# Patient Record
Sex: MALE | Race: OTHER | NOT HISPANIC OR LATINO | Employment: FULL TIME | URBAN - METROPOLITAN AREA
[De-identification: names, ages, dates, MRNs, and addresses within clinical notes are randomized per-mention and may not be internally consistent; named-entity substitution may affect disease eponyms.]

---

## 2018-12-07 ENCOUNTER — HOSPITAL ENCOUNTER (EMERGENCY)
Facility: MEDICAL CENTER | Age: 23
End: 2018-12-07
Attending: EMERGENCY MEDICINE
Payer: COMMERCIAL

## 2018-12-07 VITALS
HEIGHT: 65 IN | RESPIRATION RATE: 18 BRPM | HEART RATE: 100 BPM | DIASTOLIC BLOOD PRESSURE: 80 MMHG | SYSTOLIC BLOOD PRESSURE: 111 MMHG | TEMPERATURE: 98.5 F | OXYGEN SATURATION: 96 % | BODY MASS INDEX: 20.83 KG/M2 | WEIGHT: 125 LBS

## 2018-12-07 DIAGNOSIS — Z63.9 RELATIONSHIP PROBLEMS: ICD-10-CM

## 2018-12-07 DIAGNOSIS — F43.21 SITUATIONAL DEPRESSION: ICD-10-CM

## 2018-12-07 PROCEDURE — 99285 EMERGENCY DEPT VISIT HI MDM: CPT

## 2018-12-07 SDOH — SOCIAL STABILITY - SOCIAL INSECURITY: PROBLEM RELATED TO PRIMARY SUPPORT GROUP, UNSPECIFIED: Z63.9

## 2018-12-07 NOTE — PROGRESS NOTES
Pt cleared for discharge by ERP.    Discharging patient home. Verbalized understanding of discharge instructions, follow up appointments, and home care. All questions answered and all personal belongings with patient at time of discharge. Vital signs WNL.     Patient given discharge information papers and discharge assessment completed. All belongings returned to pt. Pt to lobby with steady gait.

## 2018-12-07 NOTE — ED PROVIDER NOTES
"ED Provider Note    Scribed for Mando Powers M.D. by Rachid Osorio. 12/7/2018  4:29 AM    Primary care provider: Pcp Pt States None  Means of arrival: Law Enforcement  History obtained from: Patient  History limited by: None    CHIEF COMPLAINT  Chief Complaint   Patient presents with   • Suicidal Ideation       HPI  Loyd Devi is a 23 y.o. male who presents to the Emergency Department for evaluation of suicidal remarks stated today to his girlfriend. He reports he recently broke up with his girlfriend causing him to make suicidal remarks. He stated he wanted \"to shoot himself and die,\" however reports he has no intentions to hurt himself at this time. He does have access to a gun, which is located in his car. He admits to alcohol use last night at 9 PM, a couple beers. He is currently a student at [employer] and admits to being stressed lately due to school and upcoming finals. No homicidal ideations or suicidal ideations at this time.     REVIEW OF SYSTEMS  Pertinent positives include suicidal remarks.  Pertinent negatives include no suicidal or homicideal ideation.    All other systems reviewed and negative. See HPI for further details.       PAST MEDICAL HISTORY   None noted     SURGICAL HISTORY  patient denies any surgical history    SOCIAL HISTORY  Social History   Substance Use Topics   • Smoking status: Never Smoker   • Alcohol use No      History   Drug Use No       FAMILY HISTORY  None noted     CURRENT MEDICATIONS  No current facility-administered medications on file prior to encounter.      No current outpatient prescriptions on file prior to encounter.      ALLERGIES  No Known Allergies    PHYSICAL EXAM  VITAL SIGNS: /70   Pulse (!) 110   Temp 36.9 °C (98.5 °F) (Oral)   Resp 18   Ht 1.651 m (5' 5\")   Wt 56.7 kg (125 lb)   SpO2 95%   BMI 20.80 kg/m²     Nursing note and vitals reviewed.  Constitutional: Well-developed and well-nourished. No distress.   HENT: Head is normocephalic and " atraumatic. Oropharynx is clear and moist without exudate or erythema.   Eyes: Pupils are equal, round, and reactive to light. Conjunctiva are normal.   Cardiovascular: Normal rate and regular rhythm. No murmur heard. Normal radial pulses.  Pulmonary/Chest: Breath sounds normal. No wheezes or rales.   Abdominal: Soft and non-tender. No distention    Musculoskeletal: Extremities exhibit normal range of motion without edema or tenderness.   Neurological: Awake, alert and oriented to person, place, and time. No focal deficits noted.  Skin: Skin is warm and dry. No rash.   Psychiatric: Normal mood and affect. Appropriate for clinical situation. Denies suicidal ideations at this time    DIAGNOSTIC STUDIES / PROCEDURES    LABS        COURSE & MEDICAL DECISION MAKING  Nursing notes, VS, PMSFHx reviewed in chart.     Review of past medical records shows the patient has no recent ED visits.     4:29 AM - Patient seen and examined at bedside. Ordered urine drug screen and POC breathalizer to evaluate his symptoms. Upon examination, the patient admits to making suicidal remarks, however reports he does not want to die or hurt himself at this time. I deem the patient as not a harm to himself or others at this time. Patient will be discharged.     The patient is forward thinking.  He is excited about the end of the semester.  He is studying criminal justice.  He said he made some suicidal comments to get some attention in the setting of breaking up with his girlfriend.  He says that he does not have any suicidal thoughts.  He has no homicidal thoughts.  He does not have any desire to hurt himself or others.  The patient is very reasonable, he has occasional brightening, he has a good plan for self-care.  I do not feel that this patient meets legal 2000 criteria.    The patient will return for new or worsening symptoms and is stable at the time of discharge.    The patient is referred to a primary physician for blood pressure  management, diabetic screening, and for all other preventative health concerns.    DISPOSITION:  Patient will be discharged home in stable condition.    FOLLOW UP:  [facility], Emergency Dept  [address]  [place] [address] [phone]  Schedule an appointment as soon as possible for a visit       [facility]    Schedule an appointment as soon as possible for a visit         FINAL IMPRESSION  1. Situational depression    2. Relationship problems          IRachid (Griffin), am scribing for, and in the presence of, Mando Powers M.D..    Electronically signed by: Rachid Osorio (Gr[place]), 12/7/2018    IMando M.D. personally performed the services described in this documentation, as scribed by Rachid Osorio in my presence, and it is both accurate and complete. E.     The note accurately reflects work and decisions made by me.  Mando Powers  12/7/2018  11:06 AM

## 2018-12-07 NOTE — ED TRIAGE NOTES
"Chief Complaint   Patient presents with   • Suicidal Ideation     Pt brought in by PD on legal hold after he stated he wanted to \"shoot himself and die\".     Pt belongings bagged.   "

## 2020-11-19 ENCOUNTER — OFFICE VISIT (OUTPATIENT)
Dept: URGENT CARE | Facility: PHYSICIAN GROUP | Age: 25
End: 2020-11-19
Payer: COMMERCIAL

## 2020-11-19 VITALS
SYSTOLIC BLOOD PRESSURE: 112 MMHG | HEIGHT: 65 IN | TEMPERATURE: 98.4 F | WEIGHT: 134 LBS | OXYGEN SATURATION: 98 % | BODY MASS INDEX: 22.33 KG/M2 | HEART RATE: 91 BPM | RESPIRATION RATE: 13 BRPM | DIASTOLIC BLOOD PRESSURE: 72 MMHG

## 2020-11-19 DIAGNOSIS — J02.9 SORE THROAT: ICD-10-CM

## 2020-11-19 DIAGNOSIS — J02.0 STREP PHARYNGITIS: Primary | ICD-10-CM

## 2020-11-19 LAB
INT CON NEG: NORMAL
INT CON POS: NORMAL
S PYO AG THROAT QL: POSITIVE

## 2020-11-19 PROCEDURE — 87880 STREP A ASSAY W/OPTIC: CPT | Performed by: PHYSICIAN ASSISTANT

## 2020-11-19 PROCEDURE — 99203 OFFICE O/P NEW LOW 30 MIN: CPT | Performed by: PHYSICIAN ASSISTANT

## 2020-11-19 RX ORDER — AMOXICILLIN 500 MG/1
500 CAPSULE ORAL 2 TIMES DAILY
Qty: 20 CAP | Refills: 0 | Status: SHIPPED | OUTPATIENT
Start: 2020-11-19 | End: 2020-11-27

## 2020-11-19 RX ORDER — FLUTICASONE PROPIONATE 50 MCG
SPRAY, SUSPENSION (ML) NASAL
COMMUNITY
Start: 2020-09-10 | End: 2021-05-18

## 2020-11-19 RX ORDER — AMOXICILLIN 500 MG/1
500 CAPSULE ORAL 2 TIMES DAILY
Qty: 20 CAP | Refills: 0 | Status: SHIPPED | OUTPATIENT
Start: 2020-11-19 | End: 2020-11-19 | Stop reason: SDUPTHER

## 2020-11-19 RX ORDER — BECLOMETHASONE DIPROPIONATE HFA 80 UG/1
2 AEROSOL, METERED RESPIRATORY (INHALATION)
COMMUNITY
Start: 2020-09-10 | End: 2021-05-18

## 2020-11-19 ASSESSMENT — ENCOUNTER SYMPTOMS
NAUSEA: 0
CONSTIPATION: 0
VOMITING: 0
ABDOMINAL PAIN: 0
SWOLLEN GLANDS: 1
DIARRHEA: 0
HOARSE VOICE: 1
SHORTNESS OF BREATH: 0
CHILLS: 0
COUGH: 0
SORE THROAT: 1
FEVER: 0

## 2020-11-19 NOTE — PROGRESS NOTES
"Subjective:   Loyd Devi is a 25 y.o. male who presents for Sore Throat (needs work release)        Pharyngitis   This is a new problem. Episode onset: 4 days. The problem has been unchanged. There has been no fever. Associated symptoms include congestion, a hoarse voice and swollen glands. Pertinent negatives include no abdominal pain, coughing, diarrhea, ear pain, plugged ear sensation, shortness of breath or vomiting. He has tried nothing for the symptoms.      No loss of smell or taste. No known ill contacts or covid exposure.     Covid test on Tuesday through [facility]. Pt work is requiring a note excusing him from work while covid test is pending.     Review of Systems   Constitutional: Negative for chills, fever and malaise/fatigue.   HENT: Positive for congestion, hoarse voice and sore throat. Negative for ear pain.    Respiratory: Negative for cough and shortness of breath.    Gastrointestinal: Negative for abdominal pain, constipation, diarrhea, nausea and vomiting.   All other systems reviewed and are negative.      PMH:  has no past medical history on file.  MEDS:   Current Outpatient Medications:   •  amoxicillin (AMOXIL) 500 MG Cap, Take 1 Cap by mouth 2 times a day for 10 days., Disp: 20 Cap, Rfl: 0  •  QVAR REDIHALER 80 MCG/ACT inhaler, Inhale 2 Puffs., Disp: , Rfl:   •  fluticasone (FLONASE) 50 MCG/ACT nasal spray, USE 2 SPRAY(S) IN EACH NOSTRIL ONCE DAILY, Disp: , Rfl:   ALLERGIES: No Known Allergies  SURGHX: History reviewed. No pertinent surgical history.  SOCHX:  reports that he has never smoked. He has never used smokeless tobacco. He reports that he does not drink alcohol or use drugs.  History reviewed. No pertinent family history.     Objective:   /72   Pulse 91   Temp 36.9 °C (98.4 °F)   Resp 13   Ht 1.651 m (5' 5\")   Wt 60.8 kg (134 lb)   SpO2 98%   BMI 22.30 kg/m²     Physical Exam  Vitals signs reviewed.   Constitutional:       General: He is not in acute " distress.     Appearance: Normal appearance. He is well-developed. He is not ill-appearing.   HENT:      Head: Normocephalic and atraumatic.      Right Ear: Tympanic membrane and external ear normal.      Left Ear: Tympanic membrane and external ear normal.      Nose: Nose normal.      Mouth/Throat:      Lips: Pink.      Mouth: Mucous membranes are moist.      Pharynx: Oropharynx is clear. Uvula midline.      Tonsils: Tonsillar exudate present. No tonsillar abscesses. 1+ on the right. 1+ on the left.   Eyes:      Conjunctiva/sclera: Conjunctivae normal.      Pupils: Pupils are equal, round, and reactive to light.   Neck:      Musculoskeletal: Normal range of motion and neck supple.      Trachea: No tracheal deviation.   Cardiovascular:      Rate and Rhythm: Normal rate and regular rhythm.   Pulmonary:      Effort: Pulmonary effort is normal. No respiratory distress.      Breath sounds: Normal breath sounds. No stridor. No wheezing, rhonchi or rales.   Skin:     General: Skin is warm and dry.      Capillary Refill: Capillary refill takes less than 2 seconds.   Neurological:      General: No focal deficit present.      Mental Status: He is alert and oriented to person, place, and time.   Psychiatric:         Mood and Affect: Mood normal.         Behavior: Behavior normal.           Assessment/Plan:     1. Strep pharyngitis  amoxicillin (AMOXIL) 500 MG Cap    DISCONTINUED: amoxicillin (AMOXIL) 500 MG Cap   2. Sore throat  POCT Rapid Strep A     Rapid strep positive    Supportive care reviewed.  Start warm salt water gargles, increase fluids.  Work note provided.    If symptoms worsen or persist patient can return to clinic for reevaluation. All side effects of medication discussed including allergic response, GI upset, tendon injury, etc. Patient confirmed understanding of information.    Please note that this dictation was created using voice recognition software. I have made every reasonable attempt to correct  obvious errors, but I expect that there are errors of grammar and possibly content that I did not discover before finalizing the note.

## 2020-11-27 ENCOUNTER — OFFICE VISIT (OUTPATIENT)
Dept: URGENT CARE | Facility: CLINIC | Age: 25
End: 2020-11-27
Payer: COMMERCIAL

## 2020-11-27 ENCOUNTER — APPOINTMENT (OUTPATIENT)
Dept: RADIOLOGY | Facility: IMAGING CENTER | Age: 25
End: 2020-11-27
Attending: PHYSICIAN ASSISTANT
Payer: COMMERCIAL

## 2020-11-27 VITALS
TEMPERATURE: 98.1 F | SYSTOLIC BLOOD PRESSURE: 116 MMHG | WEIGHT: 135 LBS | HEIGHT: 63 IN | DIASTOLIC BLOOD PRESSURE: 88 MMHG | BODY MASS INDEX: 23.92 KG/M2 | RESPIRATION RATE: 18 BRPM | HEART RATE: 110 BPM | OXYGEN SATURATION: 97 %

## 2020-11-27 DIAGNOSIS — R06.02 SHORTNESS OF BREATH: ICD-10-CM

## 2020-11-27 DIAGNOSIS — R06.6 SINGULTUS: ICD-10-CM

## 2020-11-27 PROCEDURE — 93000 ELECTROCARDIOGRAM COMPLETE: CPT | Performed by: PHYSICIAN ASSISTANT

## 2020-11-27 PROCEDURE — 99214 OFFICE O/P EST MOD 30 MIN: CPT | Performed by: PHYSICIAN ASSISTANT

## 2020-11-27 PROCEDURE — 71046 X-RAY EXAM CHEST 2 VIEWS: CPT | Mod: TC,FY | Performed by: PHYSICIAN ASSISTANT

## 2020-11-27 ASSESSMENT — ENCOUNTER SYMPTOMS
CHILLS: 0
HEADACHES: 0
PND: 0
VOMITING: 0
ORTHOPNEA: 0
MYALGIAS: 0
FEVER: 0
PALPITATIONS: 0
ABDOMINAL PAIN: 0
NAUSEA: 0
DIARRHEA: 0
SHORTNESS OF BREATH: 1
SORE THROAT: 0
EYE PAIN: 0
COUGH: 0
CLAUDICATION: 0
WHEEZING: 0
SPUTUM PRODUCTION: 0
CONSTIPATION: 0

## 2020-11-28 NOTE — PROGRESS NOTES
Subjective:   Loyd Devi is a 25 y.o. male who presents for Shortness of Breath (x today)      Is a 25-year-old male who is a long history of shortness of breath presenting today with another episode of shortness of breath started this morning.  He has been fully evaluated by pulmonology and was told this is likely not a pulmonary issue.  He is also tried albuterol and Qvar inhalers and has noticed no benefit.  He denies any associated chest pain, diaphoresis.  He does feel occasionally dizzy during these episodes although his dizziness is subsided by the time he was seen in urgent care.  He notes he has frequent hiccups and feels like he cannot catch his breath.  He denies any inciting allergens or events this morning, he tried his albuterol inhaler without any relief.  He has had a significant work-up which I cannot access from [facility] where his primary care provider is based      Review of Systems   Constitutional: Negative for chills and fever.   HENT: Negative for congestion, ear pain and sore throat.    Eyes: Negative for pain.   Respiratory: Positive for shortness of breath. Negative for cough, sputum production and wheezing.    Cardiovascular: Negative for chest pain, palpitations, orthopnea, claudication, leg swelling and PND.   Gastrointestinal: Negative for abdominal pain, constipation, diarrhea, nausea and vomiting.   Genitourinary: Negative for dysuria.   Musculoskeletal: Negative for myalgias.   Skin: Negative for rash.   Neurological: Negative for headaches.       Medications:    • fluticasone  • Qvar RediHaler Aerb    Allergies: Patient has no known allergies.    Problem List: Loyd Devi does not have a problem list on file.    Surgical History:  No past surgical history on file.    Past Social Hx: Loyd Devi  reports that he has never smoked. He has never used smokeless tobacco. He reports that he does not drink alcohol or use drugs.     Past Family Hx:  " Loyd Devi family history is not on file.     Problem list, medications, and allergies reviewed by myself today in Epic.     Objective:     /88   Pulse (!) 110   Temp 36.7 °C (98.1 °F) (Temporal)   Resp 18   Ht 1.6 m (5' 3\")   Wt 61.2 kg (135 lb)   SpO2 97%   BMI 23.91 kg/m²     Physical Exam  Vitals signs reviewed.   Constitutional:       Appearance: Normal appearance.      Comments: Alert nontoxic male speaking in full sentences, occasionally burping or catching his breath while speaking.  No increased work of breathing   HENT:      Head: Normocephalic and atraumatic.      Right Ear: External ear normal.      Left Ear: External ear normal.      Nose: Nose normal.      Mouth/Throat:      Mouth: Mucous membranes are moist.   Eyes:      Conjunctiva/sclera: Conjunctivae normal.   Cardiovascular:      Rate and Rhythm: Normal rate and regular rhythm.      Pulses: Normal pulses.      Heart sounds: Normal heart sounds.   Pulmonary:      Effort: Pulmonary effort is normal. No respiratory distress.      Breath sounds: Normal breath sounds. No wheezing, rhonchi or rales.   Musculoskeletal:      Right lower leg: No edema.      Left lower leg: No edema.   Skin:     General: Skin is warm and dry.      Capillary Refill: Capillary refill takes less than 2 seconds.   Neurological:      Mental Status: He is alert and oriented to person, place, and time.         RADIOLOGY RESULTS   Dx-chest-2 Views    Result Date: 11/27/2020 11/27/2020 3:30 PM HISTORY/REASON FOR EXAM:  Shortness of Breath TECHNIQUE/EXAM DESCRIPTION AND NUMBER OF VIEWS: Two views of the chest. COMPARISON:  4/21/2014. FINDINGS: No pulmonary infiltrates or consolidations are noted. No pleural effusions, no pneumothorax are appreciated. Normal cardiopericardial silhouette. Visualized osseous structures are unremarkable.     1. No active cardiopulmonary abnormalities are identified.         EKG interpreted by me at 1515 is sinus with a rate of 99, " normal intervals, normal axis.  Unchanged from previous EKG on record no evidence of ischemia or right heart strain  Assessment/Plan:     Diagnosis and associated orders:     1. Shortness of breath  EKG - Clinic Performed    DX-CHEST-2 VIEWS   2. [place]        Comments/MDM:     • Patient's presentation is puzzling, however his vital signs and exam, EKG and chest x-ray are reassuring.  I agree with the radiologist after independently reviewing the imaging.  I had a long conversation with him about the differential diagnosis for shortness of breath.  I do not suspect a cardiac or pulmonary cause at this point.  I advised him to follow-up regarding anxiety or possible acid reflux is being positive.  We gave him a GI cocktail in clinic as he has had this repetitive, burping.  I counseled him to trial of Pepcid and follow-up with his primary care provider in advised him to get a pulse oximeter so he can objectively assess his shortness of breath and present to the ER if he experiences worsening shortness of breath         Differential diagnosis, natural history, supportive care, and indications for immediate follow-up discussed.    Advised the patient to follow-up with the primary care physician for recheck, reevaluation, and consideration of further management.    Please note that this dictation was created using voice recognition software. I have made a reasonable attempt to correct obvious errors, but I expect that there are errors of grammar and possibly content that I did not discover before finalizing the note.    This note was electronically signed by Chad Martinez PA-C

## 2020-12-26 ENCOUNTER — HOSPITAL ENCOUNTER (OUTPATIENT)
Facility: MEDICAL CENTER | Age: 25
End: 2020-12-26
Payer: COMMERCIAL

## 2020-12-26 LAB — COVID ORDER STATUS COVID19: NORMAL

## 2020-12-27 LAB
SARS-COV-2 RNA RESP QL NAA+PROBE: NOTDETECTED
SPECIMEN SOURCE: NORMAL

## 2021-05-04 ENCOUNTER — TELEPHONE (OUTPATIENT)
Dept: SCHEDULING | Facility: IMAGING CENTER | Age: 26
End: 2021-05-04

## 2021-05-04 NOTE — TELEPHONE ENCOUNTER
Patient has an upcoming appointment and records need to be obtained prior to the visit. Please reference appointment notes for the name of the entity.      Routing comment      NP/ PRIOR NONE/ EST CARE/ NO PAIN MED REFILLS/ NEW INS HTH   *PT DECLINED ALL COVID SCREENING QUESTIONS, ADVISED OF MASK*  *PT REQ DATE & TIME*    NO PRIOR PCP NOTED UNABLE TO OBTAIN RECORDS

## 2021-05-11 ENCOUNTER — TELEPHONE (OUTPATIENT)
Dept: MEDICAL GROUP | Facility: PHYSICIAN GROUP | Age: 26
End: 2021-05-11

## 2021-05-11 NOTE — TELEPHONE ENCOUNTER
Future Appointments       Provider Department Center    5/18/2021 9:30 AM SANCHEZ Preston [facility]    7/14/2021 1:00 PM Vimal Potter M.D. [facility] - Neurology       NEW PATIENT VISIT PRE-VISIT PLANNING    1.  [employer] Patient is checked in Patient Demographics?Yes    2.  Immunizations were updated in Epic using Reconcile Outside Information activity? Yes         3.  Is this appointment scheduled as a Hospital Follow-Up? No    4.  Patient is due for the following Health Maintenance Topics:   Health Maintenance Due   Topic Date Due   • COVID-19 Vaccine (1) Never done     5.  Reviewed/Updated the following with patient:       •   Preferred Pharmacy? Yes       •   Preferred Lab? Yes       •   Preferred Communication? Yes       •   Allergies? Yes       •   Medications? YES. Was Abstract Encounter opened and chart updated? YES       •   Social History? Yes       •   Family History (document living status of immediate family members and if + hx of  cancer, diabetes, hypertension, hyperlipidemia, heart attack, stroke) Yes    6.  Updated Care Team?       •   DME Company (gait device, O2, CPAP, etc.) NO       •   Other Specialists (eye doctor, derm, GYN, cardiology, endo, etc): N\A    7.  AHA (Puls8) form printed for Provider? N/A   Patient advised to arrive 15 minutes prior to scheduled arrival

## 2021-05-18 ENCOUNTER — OFFICE VISIT (OUTPATIENT)
Dept: MEDICAL GROUP | Facility: PHYSICIAN GROUP | Age: 26
End: 2021-05-18
Payer: COMMERCIAL

## 2021-05-18 VITALS
SYSTOLIC BLOOD PRESSURE: 104 MMHG | HEIGHT: 64 IN | OXYGEN SATURATION: 98 % | TEMPERATURE: 97.7 F | RESPIRATION RATE: 16 BRPM | BODY MASS INDEX: 22.71 KG/M2 | DIASTOLIC BLOOD PRESSURE: 70 MMHG | HEART RATE: 98 BPM | WEIGHT: 133 LBS

## 2021-05-18 DIAGNOSIS — R06.02 SHORTNESS OF BREATH: ICD-10-CM

## 2021-05-18 DIAGNOSIS — M62.81 MUSCLE WEAKNESS (GENERALIZED): ICD-10-CM

## 2021-05-18 PROCEDURE — 99202 OFFICE O/P NEW SF 15 MIN: CPT | Performed by: NURSE PRACTITIONER

## 2021-05-18 ASSESSMENT — PATIENT HEALTH QUESTIONNAIRE - PHQ9: CLINICAL INTERPRETATION OF PHQ2 SCORE: 0
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
## 2021-05-18 NOTE — ASSESSMENT & PLAN NOTE
Reports that he feels like he is not getting enough oxygen.  o2 sat is 98% today.  Hr 98.  Denies GERD, reports extra burping.  Denies anxiety at this time.  Reports two years that these symptoms.

## 2021-05-18 NOTE — ASSESSMENT & PLAN NOTE
Gradual onset with feeling like he cannot repeat muscular tasks.  Reports some shortness of breath.  Saw neuro and they ruled out ALS and MS. EMG was normal.  Seeing neurologist at [facility]. Appointment in July.   Symptoms have been worsening.  Reports labored breathing.  No balance issues, no recent falls.   Has seen pulmonology and all testing was negative.

## 2021-07-09 ENCOUNTER — OFFICE VISIT (OUTPATIENT)
Dept: URGENT CARE | Facility: PHYSICIAN GROUP | Age: 26
End: 2021-07-09
Payer: COMMERCIAL

## 2021-07-09 ENCOUNTER — HOSPITAL ENCOUNTER (OUTPATIENT)
Dept: RADIOLOGY | Facility: MEDICAL CENTER | Age: 26
End: 2021-07-09
Attending: PHYSICIAN ASSISTANT
Payer: COMMERCIAL

## 2021-07-09 VITALS
BODY MASS INDEX: 22.49 KG/M2 | DIASTOLIC BLOOD PRESSURE: 74 MMHG | OXYGEN SATURATION: 96 % | HEART RATE: 90 BPM | HEIGHT: 65 IN | RESPIRATION RATE: 16 BRPM | TEMPERATURE: 97.2 F | WEIGHT: 135 LBS | SYSTOLIC BLOOD PRESSURE: 120 MMHG

## 2021-07-09 DIAGNOSIS — R07.9 NONSPECIFIC CHEST PAIN: ICD-10-CM

## 2021-07-09 DIAGNOSIS — R07.9 CHEST PAIN, UNSPECIFIED TYPE: ICD-10-CM

## 2021-07-09 LAB — EKG 4674: NORMAL

## 2021-07-09 PROCEDURE — 99213 OFFICE O/P EST LOW 20 MIN: CPT | Performed by: PHYSICIAN ASSISTANT

## 2021-07-09 PROCEDURE — 71046 X-RAY EXAM CHEST 2 VIEWS: CPT

## 2021-07-09 ASSESSMENT — ENCOUNTER SYMPTOMS
DIAPHORESIS: 0
SORE THROAT: 0
LOWER EXTREMITY EDEMA: 0
VOMITING: 0
IRREGULAR HEARTBEAT: 0
PALPITATIONS: 0
HEADACHES: 0
FEVER: 0
COUGH: 0
SHORTNESS OF BREATH: 0
NAUSEA: 0
EYE DISCHARGE: 0
EYE REDNESS: 0

## 2021-07-09 NOTE — PROGRESS NOTES
"Subjective:      Loyd Devi is a 25 y.o. male who presents with Chest Pain (x2 days, pressure in chest yesterday, no chest pain anymore )          This is a new problem.  The patient presents to clinic complaining of left-sided chest pain x1 day ago.  The patient states he was eating sushi when he developed a sudden onset sharp pain to the left side of his chest.  The patient states he had to \"clutch\" his chest while he was experiencing the pain.  The patient states the pain lasted seconds prior to resolving.  The patient reports no radiation of pain during this time.  The patient states he is now experiencing a persistent dull ache to the mid back.  He reports no persistent chest pain.  The patient also reports no associated fever.  No cough.  No congestion.  No shortness of breath.  No diaphoresis.  No lower extremity edema.  No palpitations.  The patient reports no prior history of cardiac problems.  He also reports no prior history of PE/DVT.  The patient reports no recent prolonged sitting/travel.  He is a non-smoker.  The patient denies the use of oral hormones.  The patient has not taken any OTC medications for his current symptoms.  The patient reports no known exposure to COVID-19.  The patient states he recently received his second dose of the Pfizer COVID-19 vaccine 2 weeks ago.    Chest Pain   This is a new problem. Episode onset: x 1 day ago. The onset quality is sudden. The problem occurs intermittently. The problem has been resolved. Pain location: The patient states the pain was located to the left side of his chest. The quality of the pain is described as tightness and sharp. The pain radiates to the mid back (The patient reports a dull ache to the mid back, which has been persistent.). Pertinent negatives include no cough, diaphoresis, fever, headaches, irregular heartbeat, lower extremity edema, nausea, palpitations, shortness of breath or vomiting. The pain is aggravated by nothing. He " "has tried nothing for the symptoms.     PMH:  has a past medical history of Muscle disorder.  MEDS: No current outpatient medications on file.  ALLERGIES: No Known Allergies  SURGHX: No past surgical history on file.  SOCHX:  reports that he has never smoked. He has never used smokeless tobacco. He reports current alcohol use of about 1.2 oz of alcohol per week. He reports that he does not use drugs.  FH: Family history was reviewed, no pertinent findings to report    Review of Systems   Constitutional: Negative for diaphoresis and fever.   HENT: Negative for congestion, ear pain and sore throat.    Eyes: Negative for discharge and redness.   Respiratory: Negative for cough and shortness of breath.    Cardiovascular: Positive for chest pain. Negative for palpitations and leg swelling.   Gastrointestinal: Negative for nausea and vomiting.   Musculoskeletal: Negative for joint pain.   Skin: Negative for rash.   Neurological: Negative for headaches.          Objective:     /74   Pulse 90   Temp 36.2 °C (97.2 °F) (Temporal)   Resp 16   Ht 1.651 m (5' 5\")   Wt 61.2 kg (135 lb)   SpO2 96%   BMI 22.47 kg/m²      Physical Exam  Constitutional:       General: He is not in acute distress.     Appearance: Normal appearance. He is not ill-appearing.   HENT:      Head: Normocephalic and atraumatic.      Right Ear: External ear normal.      Left Ear: External ear normal.      Nose: Nose normal.      Mouth/Throat:      Mouth: Mucous membranes are moist.      Pharynx: Oropharynx is clear. No posterior oropharyngeal erythema.   Eyes:      Extraocular Movements: Extraocular movements intact.      Conjunctiva/sclera: Conjunctivae normal.   Cardiovascular:      Rate and Rhythm: Normal rate and regular rhythm.      Heart sounds: Normal heart sounds.   Pulmonary:      Effort: Pulmonary effort is normal. No respiratory distress.      Breath sounds: Normal breath sounds. No wheezing.   Musculoskeletal:         General: Normal " range of motion.      Cervical back: Normal range of motion and neck supple.   Skin:     General: Skin is warm and dry.   Neurological:      Mental Status: He is alert and oriented to person, place, and time.              Progress:  CXR:  COMPARISON:  11/27/2020     FINDINGS:     Cardiomediastinal silhouette is normal.     No focal consolidation, pleural effusion, pulmonary edema or pneumothorax.     No acute osseous abnormality.     IMPRESSION:  No acute cardiopulmonary abnormality.      EKG:   EKG Interpretation   Interpreted by me   Rhythm: normal sinus   Rate: normal   Axis: normal   Ectopy: none   Conduction: normal   ST Segments: no acute change   T Waves: no acute change   Q Waves: none   Clinical Impression: no acute changes and normal EKG  This is unchanged from patient's previous EKG on 11/27/2020.       Assessment/Plan:          1. Nonspecific chest pain  - EKG  - DX-CHEST-2 VIEWS; Future    The patient's presenting symptoms and physical exam findings are consistent with nonspecific chest pain.  The patient had a single episode of chest pain yesterday afternoon lasting several seconds prior to resolving.  The patient reports no additional episodes of chest pain.  The patient's physical exam today in clinic was normal.  The patient's heart was regular rate and rhythm without murmurs, gallops, or rubs.  The patient's lungs are clear to auscultation without wheezing, and his pulse ox was within normal limits.  The patient appears in no acute distress.  The patient's vital signs are stable and within normal limits.  He is afebrile today in clinic.  A chest x-ray and an EKG were obtained to further evaluate the patient's current symptoms.  The patient's chest x-ray showed no acute cardiopulmonary abnormality.  The patient's EKG showed normal sinus rhythm with a heart rate of 70.  No acute ST segment changes were noted.  This is unchanged from the patient's previous EKG on 11/27/2020.  The cause of the  patient's clinical episode of left-sided chest pain is unknown at this time.  Advised the patient that a normal chest x-ray and a normal EKG in clinic do not rule out possible cardiac etiology.  Informed the patient he would need to be seen and evaluated in the ED for a complete cardiac work-up to rule out possible cardiac etiology.  The patient verbalized understanding.  The patient is young and healthy with no risk factors for cardiac disease.  The patient also has no risk factors for possible PE.  The patient's PERC score is 0.  The patient recently received his second dose of the Pfizer COVID-19 vaccine.  Based on the patient's presenting symptoms and physical exam findings, I have low suspicion for acute myocarditis, although this is a remote possibility.  However, I would expect the patient to be experiencing persistent chest pain, shortness of breath, possible fever, and have changes to his EKG.  The patient declined further evaluation in the ED at this time.  Advised the patient to monitor for worsening signs and/or symptoms.  Discussed STRICT ED precautions with the patient, and he verbalized understanding.  Recommend patient follow-up with PCP.    Differential diagnoses, supportive care, and indications for immediate follow-up discussed with patient.   Instructed to return to clinic or nearest emergency department for any change in condition, further concerns, or worsening of symptoms.    OTC Tylenol or Motrin for fever/discomfort.  Monitor for worsening signs and her symptoms  Follow-up with PCP  Return to clinic or go to the ED if symptoms worsen or fail to improve, or if the patient should develop worsening/increasing/persistent chest pain, radiation of pain, shortness of breath, palpitations, lower extremity edema, cough, congestion, ear pain, sore throat, wheezing, headache, nausea/vomiting, fever/chills, and/or any concerning symptoms.    Discussed plan with the patient, and he agrees to the  above.    I personally reviewed prior external notes and test results pertinent to today's visit.  I have independently reviewed and interpreted all diagnostics ordered during this urgent care visit.     Time spent evaluating this patient was at least 30 minutes and includes preparing for visit, obtaining history, exam and evaluation, ordering labs/tests/procedures/medications, independent interpretation, and counseling/education.    Please note that this dictation was created using voice recognition software. I have made every reasonable attempt to correct obvious errors, but I expect that there may be errors of grammar and possibly content that I did not discover before finalizing the note.     This note was electronically signed by Tri Shay PA-C

## 2021-07-14 ENCOUNTER — OFFICE VISIT (OUTPATIENT)
Dept: NEUROLOGY | Facility: MEDICAL CENTER | Age: 26
End: 2021-07-14
Attending: PSYCHIATRY & NEUROLOGY
Payer: COMMERCIAL

## 2021-07-14 VITALS
BODY MASS INDEX: 22.77 KG/M2 | HEART RATE: 68 BPM | SYSTOLIC BLOOD PRESSURE: 108 MMHG | HEIGHT: 65 IN | DIASTOLIC BLOOD PRESSURE: 76 MMHG | WEIGHT: 136.69 LBS | TEMPERATURE: 98 F | OXYGEN SATURATION: 97 %

## 2021-07-14 DIAGNOSIS — G82.50 QUADRIPARESIS (HCC): Primary | ICD-10-CM

## 2021-07-14 PROCEDURE — 99205 OFFICE O/P NEW HI 60 MIN: CPT | Performed by: PSYCHIATRY & NEUROLOGY

## 2021-07-14 PROCEDURE — 99211 OFF/OP EST MAY X REQ PHY/QHP: CPT | Performed by: PSYCHIATRY & NEUROLOGY

## 2021-07-14 ASSESSMENT — ENCOUNTER SYMPTOMS
WEAKNESS: 1
MEMORY LOSS: 0
TINGLING: 0
SENSORY CHANGE: 0
LOSS OF CONSCIOUSNESS: 0
FALLS: 0
DOUBLE VISION: 0
FOCAL WEAKNESS: 0
CONSTIPATION: 0
TREMORS: 0

## 2021-07-14 NOTE — PROGRESS NOTES
Subjective:      oLyd Devi is a 25 y.o. male who presents from the office of JAIMIE Preston, for consultation, with a history of a slowly progressive generalized weakness and loss of endurance dating back a few years.    HPI    Loyd is a pleasant 25-year-old right-handed gentleman whose weakness started about 2 or 3 years ago, there was no singular or inciting event following which his symptoms started.  Over time things have progressed, initially in the lower extremities, it now seems to involve all of them, he is even finding himself short of breath with loss of endurance with less strenuous physical activity.  He also has noted some difficulty with swallowing, when he talks, he feels as if his jaw can get fatigue.    It is a generalized pattern of weakness, present in the mornings, he feels it might actually be a little bit more noticeable then, but as the day goes on it does become more prominent.  He works out regularly, plays soccer on a routine basis, he cannot run sprints on the soccer pitch like he used to be able to.  With repetitions at the gym, he can only do half the number at a given weight.  He can lift the same amount of weight initially.  There is no tightness or cramping in the muscles, they are not sore afterwards.    In general he can stand and walk without issue, but the longer he is doing so he notices the extremities getting heavier.  Dexterity with the hands and fingers has become more noticeably curtailed, even holding a phone to text is not normal.    He denies cramping at nighttime, ptosis, diplopia, changes in bowel or bladder function, neck pain with radiating symptoms into the upper extremities, Lhermitte's phenomena, sensory loss, rash, arthralgias, lower extremity edema, or skin changes.  He has not noticed any selective muscular atrophy or fasciculations.  Cognition has been intact.    I reviewed the electronic health record, he brought with him copies of EMG/NCV  "studies from December 3, repeated on December 10, 2020.  The studies were of all 4 extremities, as well as the paraspinal muscle bodies of the cervical and thoracic spines, these were all normal.  Unfortunately, repetitive stimulation was not done.  Report of blood work including CPK, aldolase, autoimmune screens, etc. also were available for review, these were completely normal.  Mention of myasthenia reflex panel was made, these results were not available.  He was certainly not contacted about these results.  He did see a pulmonologist because of his shortness of breath complaints, evidently their work-ups proved unremarkable.  He has not seen a cardiologist, he denies echocardiographic studies being done.    Medical history is negative for diagnosed autoimmune disease, neurodegenerative disease, diabetes, thyroid disease, malignancy, hypertension, CAD, CVA, PVD, MS, seizure, migraine, blood dyscrasia, liver or kidney disease, or pulmonary disease.  There is no surgical history of note.    No one in the family has a history of similar symptoms or diagnosed neurodegenerative disease, neuromuscular disease, or demyelinating disease.    He does not smoke, occasionally drinks on weekends, is an , sitting behind a desk for the most part.  He works out regularly, is engaged in soccer events with a local club.  He is presently on no medications.    Review of Systems   Constitutional: Positive for malaise/fatigue.   Eyes: Negative for double vision.   Gastrointestinal: Negative for constipation.   Genitourinary: Negative for frequency and urgency.   Musculoskeletal: Negative for falls.   Neurological: Positive for weakness. Negative for tingling, tremors, sensory change, focal weakness and loss of consciousness.   Psychiatric/Behavioral: Negative for memory loss.   All other systems reviewed and are negative.       Objective:     /76   Pulse 68   Temp 36.7 °C (98 °F)   Ht 1.651 m (5' 5\")   Wt 62 kg " (136 lb 11 oz)   SpO2 97%   BMI 22.75 kg/m²      Physical Exam    He appears in no acute distress.  Vital signs are stable.  There is no malar rash.  The neck is supple, range of motion is full, Lhermitte's phenomena is absent, compression maneuvers are negative.  The muscles are nontender, there is no evidence of diffuse rash.  Cardiac evaluation reveals a regular rhythm.    Neurological Exam    Fully oriented, there is no aphasia, agnosia, apraxia, or inattention.    PERRLA/EOMI, even with sustained upgaze, there is no ptosis bilaterally.  Facial movements are symmetric, the tongue and uvula are midline, lateral jaw movements are intact.  There is no weakness of neck rotation, flexion, or extension.     Musculoskeletal exam reveals normal tone throughout, there is no tremor, asterixis, or drift.  There is no evidence of focal atrophy or fasciculations, there is no myotonia on percussion of the thenar eminence bilaterally.  Strength is 5/5 throughout, but after 30 seconds of arm flapping, subjectively he felt as if the arms were beginning to fatigue.  There was no objective evidence of weakness.  Reflexes are brisk throughout, very brisk in the lower extremities, though there was no spread, and both toes are downgoing.  There is no ankle clonus.    He stands easily, armswing is symmetric, gait is normal and station and stride length as he walks.  Heel, toe, and tandem walking are all normal.  There is no appendicular dystaxia in any of the extremities.  Repetitive movements and fine motor control are normal in all 4 extremities with symmetric amplitude and frequencies.    Sensory exam is intact to vibration and temperature, Romberg is absent.     Assessment/Plan:     1. Quadriparesis (HCC)  Very unusual presentation, EMG/NCV studies so far have been quite thorough and unremarkable though repetitive stimulation does need to be performed if there is high enough level suspicion that this could be neuromuscular  disease.  The muscle biopsy had been mentioned in the referral, I would like to hold off on this absent any clear evidence of an inflammatory process such as polymyositis.  Imaging of the brain and neck should be done for thoroughness; glycogen-storage diseases, lysosomal diseases, etc., can present with weakness.  There is no evidence of ALS, fortunately.  Pure intrinsic myopathic disease does not typically involve distal as well as proximal muscle groups, and in all 4 extremities.  Even hereditary disorder such as FSH also show muscular atrophy and wasting.    We spent some time talking about the possibilities as an explanation of his symptoms, especially myasthenia.  I will have him sign a release so I can hopefully get these test results made available.  An empiric treatment with pyridostigmine might also be warranted.  He was reassured that remaining physically active will not make the underlying process that he may have progress or worsen.  His body will tell him when he is approaching his threshold and when he needs to back off.  We will call him with the results as they come in, otherwise we can talk specifics when we follow-up in the office.    I might also recommend considering a cardiac evaluation to make sure that we are not seeing a progressive cardiac disorder causing reduced EF with symptomatic malaise, fatigue, dyspnea, shortness of breath, etc.    - MR-BRAIN-WITH & W/O; Future  - MR-CERVICAL SPINE-WITH & W/O; Future  - EMG    Time: 60 minutes spent face-to-face for exam, review, discussion, and education, of this over 50% of the time spent counseling and coordinating care.

## 2021-07-22 ENCOUNTER — NON-PROVIDER VISIT (OUTPATIENT)
Dept: NEUROLOGY | Facility: MEDICAL CENTER | Age: 26
End: 2021-07-22
Attending: PSYCHIATRY & NEUROLOGY
Payer: COMMERCIAL

## 2021-07-22 DIAGNOSIS — G82.50 QUADRIPARESIS (HCC): ICD-10-CM

## 2021-07-22 PROCEDURE — 95886 MUSC TEST DONE W/N TEST COMP: CPT | Mod: 26,RT | Performed by: PSYCHIATRY & NEUROLOGY

## 2021-07-22 PROCEDURE — 95937 NEUROMUSCULAR JUNCTION TEST: CPT | Mod: 26 | Performed by: PSYCHIATRY & NEUROLOGY

## 2021-07-22 PROCEDURE — 95886 MUSC TEST DONE W/N TEST COMP: CPT | Performed by: PSYCHIATRY & NEUROLOGY

## 2021-07-22 PROCEDURE — 95937 NEUROMUSCULAR JUNCTION TEST: CPT | Performed by: PSYCHIATRY & NEUROLOGY

## 2021-07-22 PROCEDURE — 95910 NRV CNDJ TEST 7-8 STUDIES: CPT | Mod: 26 | Performed by: PSYCHIATRY & NEUROLOGY

## 2021-07-22 PROCEDURE — 95910 NRV CNDJ TEST 7-8 STUDIES: CPT | Performed by: PSYCHIATRY & NEUROLOGY

## 2021-07-22 NOTE — PROCEDURES
"NERVE CONDUCTION STUDIES AND ELECTROMYOGRAPHY REPORT  [facility]  07/22/21          IMPRESSION:  This is a normal electrodiagnostic study.  There is no evidence of cervical/lumbosacral radiculopathy, myopathy, or large fiber peripheral neuropathy in the right upper and lower extremity.  Repetitive nerve stimulation of the right ulnar nerve and right spinal sensory nerve are normal without decrement.  No significant changes compared to December 2020 study (outside facility).      Alta Nobles MD  Neurology - Neurophysiology        REASON FOR REFERRAL:  Mr. Loyd Devi 25 y.o. referred by Dr. Vimal Potter for evaluation of generalized weakness of uncertain etiology.  Symptoms have been ongoing for at least 3 months and most noticeable when he is attempting to exercise and lift weights.  This is associated with some mild trouble swallowing.  There are no sensory symptoms or pain.    Height: 5'5\"  Weight: 135 lbs    Symptom focused neurological exam shows normal strength and sensation in the bilateral upper and lower extremities.      ELECTRODIAGNOSTIC EXAMINATION:  Nerve conduction studies (NCS) and electromyography (EMG) are utilized to evaluate direct or indirect damage to the peripheral nervous system. NCS are performed to measure the nerve(s) response(s) to electrostimulation across a given nerve segment. EMG evaluates the passive and active electrical activity of the muscle(s) in question.  Muscles are innervated by specific peripheral nerves and roots. Often times, several nerves the muscle to be examined in order to determine the presence or absence of the disease process. Furthermore, nerves and muscles may need to be tested in a [id number] comparison, as well as in additional extremities, as this may be crucial in characterizing the extent of the disease process, which may be diffuse or isolated and of varying degree of severity. The extent of the neurodiagnostic exam is " justified as it may help arrive to a proper diagnosis, which ultimately may contribute to better management of the patient. Therefore, the nerves to muscles examined during the study were medically necessary.    Unless otherwise noted, temperature of the extremity(s) study was monitored before and during the examination and remained between 32 and 36 degrees C for the upper extremities, and between 30 and 36 degrees C for the lower extremities. The patient tolerated testing well, without any complications.       NERVE CONDUCTION STUDY SUMMARY:  Selected nerves of the right upper and lower extremity are studied.    Normal right median and ulnar sensory and motor responses.   Post brief exercise of the right ADM without clinically significant increase in CMAP.  Normal right common peroneal and tibial motor responses at the EDB and AH respectively.  Normal right sural sensory response.    Repetitive nerve stimulation is performed suing trains of 10 stimuli at 3Hz, stimulating the right ulnar nerve at the wrist and recording the right ADM, and stimulating the right spinal accessory nerve in the neck and recording the right trapezius. Baseline and post exercise trains were normal without decrement.    NEEDLE EMG SUMMARY:  Concentric needle study of selected right upper and lower extremity and lower lumbar paraspinal muscles is performed.     Insertion activity is normal in all muscles sampled.   With activation, there are normal morphology (amplitude/duration) motor unit action potentials firing with normal recruitment in muscles tested.       PATIENT DATA TABLES  Nerve Conduction Studies     Stim Site NR Onset (ms) Norm Onset (ms) O-P Amp (µV) Norm O-P Amp Site1 Site2 Delta-P (ms) Dist (cm) Durga (m/s) Norm Durga (m/s)   Right Median Anti Sensory (2nd Digit)   Wrist    1.4 <3.3 81.0 >20 Wrist 2nd Digit 3.0 14.0 *47 >50   Right Sural Anti Sensory (Lat Mall)   Calf    2.6 <4.4 26.3 >6 Calf Lat Mall 3.5 14.0 40 >40   Right  Ulnar Anti Sensory (5th Digit)   Wrist    2.3 <3.0 30.7 >18 Wrist 5th Digit 3.3 14.0 *42 >50        Stim Site NR Onset (ms) Norm Onset (ms) O-P Amp (mV) Norm O-P Amp Site1 Site2 Delta-0 (ms) Dist (cm) Durga (m/s) Norm Durga (m/s)   Right Median Motor (Abd Poll Brev)   Wrist    2.7 <3.9 9.0 >6 Elbow Wrist 3.7 21.5 58 >50   Elbow    6.4  9.0          Right Peroneal EDB Motor (Ext Dig Brev)   Ankle    3.8 <5.5 6.4 >3.0 B Fib Ankle 5.9 29.5 50 >40   B Fib    9.7  6.3  Poplt B Fib 1.9 10.0 53    Poplt    11.6  6.1          Right Tibial Motor (Abd Ontiveros Brev)   Ankle    3.1 <5.8 15.9 >8 Knee Ankle 7.4 37.0 50 >40   Knee    10.5  13.8          Right Ulnar Motor (Abd Dig Min)   Wrist    2.6 <3 9.2 >8 B Elbow Wrist 3.2 20.5 64 >50   B Elbow    5.8  8.8  A Elbow B Elbow 1.5 10.0 67    A Elbow    7.3  8.6  Axilla A Elbow 4.7 0.0     Post 10 sec ex    2.6  9.8                               Electromyography     Side Muscle Nerve Root Ins Act Fibs Psw Amp Dur Poly Recrt Int Pat Comment   Right AntTibialis Dp Br Fibular L4-5 Nml Nml Nml Nml Nml 0 Nml Nml    Right Gastroc Tibial S1-2 Nml Nml Nml Nml Nml 0 Nml Nml    Right VastusLat Femoral L2-4 Nml Nml Nml Nml Nml 0 Nml Nml    Right GluteusMed SupGluteal L5-S1 Nml Nml Nml Nml Nml 0 Nml Nml    Right Lumbo Parasp Low Rami L5-S1 Nml Nml Nml         Right 1stDorInt Ulnar C8-T1 Nml Nml Nml Nml Nml 0 Nml Nml    Right PronatorTeres Median C6-7 Nml Nml Nml Nml Nml 0 Nml Nml    Right Biceps Musculocut C5-6 Nml Nml Nml Nml Nml 0 Nml Nml    Right Triceps Radial C6-7-8 Nml Nml Nml Nml Nml 0 Nml Nml    Right Deltoid Axillary C5-6 Nml Nml Nml Nml Nml 0 Nml Nml        RNS     Trial # Label Amp 1 (mV)  O-P Amp 5 (mV)  O-P Amp % Dif Area 1 (mV·ms) Area 5 (mV·ms) Area % Dif Rep Rate Train Length Pause Time (min:sec) Comments   Right Abd Dig Min   Tr 1 Baseline 10.28 9.98 -2.9 38.87 36.76 -5.4 3.00 10 01:00    Tr 2 Post Exercise 11.06 10.80 -2.4 40.38 39.52 -2.1 3.00 10 01:00    Tr 3 1 min Post 10.49  10.86 3.6 40.99 39.05 -4.7 3.00 10 01:00    Tr 4 2 min Post 10.64 10.51 -1.2 39.86 38.01 -4.6 3.00 10 01:00    Tr 5 3 min Post 10.53 10.42 -1.1 39.34 37.33 -5.1 3.00 10 00:00    Right Trapezius   Tr 1 Baseline 6.57 6.21 -5.5 57.71 50.45 -12.6 3.00 10 01:00    Tr 2 Post Exercise 6.08 5.97 -1.8 50.20 45.62 -9.1 3.00 10 01:00    Tr 3 1 min Post 6.67 6.41 -3.8 58.67 52.41 -10.7 3.00 10 01:00    Tr 4 2 min Post 6.55 6.63 1.1 57.59 52.65 -8.6 3.00 10 01:00    Tr 5 3 min Post 6.61 6.48 -1.9 58.01 53.09 -8.5 3.00 10 00:00

## 2021-08-17 ENCOUNTER — HOSPITAL ENCOUNTER (OUTPATIENT)
Dept: RADIOLOGY | Facility: MEDICAL CENTER | Age: 26
End: 2021-08-17
Attending: PSYCHIATRY & NEUROLOGY
Payer: COMMERCIAL

## 2021-08-17 DIAGNOSIS — G82.50 QUADRIPARESIS (HCC): ICD-10-CM

## 2021-08-17 PROCEDURE — 70553 MRI BRAIN STEM W/O & W/DYE: CPT

## 2021-08-17 PROCEDURE — 72156 MRI NECK SPINE W/O & W/DYE: CPT

## 2021-08-17 PROCEDURE — A9576 INJ PROHANCE MULTIPACK: HCPCS | Performed by: PSYCHIATRY & NEUROLOGY

## 2021-08-17 PROCEDURE — 700117 HCHG RX CONTRAST REV CODE 255: Performed by: PSYCHIATRY & NEUROLOGY

## 2021-08-17 RX ADMIN — GADOTERIDOL 13 ML: 279.3 INJECTION, SOLUTION INTRAVENOUS at 15:20

## 2021-09-15 ENCOUNTER — TELEPHONE (OUTPATIENT)
Dept: MEDICAL GROUP | Facility: PHYSICIAN GROUP | Age: 26
End: 2021-09-15

## 2021-09-15 ENCOUNTER — OFFICE VISIT (OUTPATIENT)
Dept: NEUROLOGY | Facility: MEDICAL CENTER | Age: 26
End: 2021-09-15
Attending: PSYCHIATRY & NEUROLOGY
Payer: COMMERCIAL

## 2021-09-15 VITALS
HEART RATE: 97 BPM | DIASTOLIC BLOOD PRESSURE: 84 MMHG | SYSTOLIC BLOOD PRESSURE: 108 MMHG | WEIGHT: 145.5 LBS | OXYGEN SATURATION: 98 % | TEMPERATURE: 98.6 F | RESPIRATION RATE: 16 BRPM | BODY MASS INDEX: 24.21 KG/M2

## 2021-09-15 DIAGNOSIS — M62.81 MUSCLE WEAKNESS (GENERALIZED): Primary | ICD-10-CM

## 2021-09-15 PROCEDURE — 99211 OFF/OP EST MAY X REQ PHY/QHP: CPT | Performed by: PSYCHIATRY & NEUROLOGY

## 2021-09-15 PROCEDURE — 99213 OFFICE O/P EST LOW 20 MIN: CPT | Performed by: PSYCHIATRY & NEUROLOGY

## 2021-09-15 ASSESSMENT — ENCOUNTER SYMPTOMS
WEAKNESS: 1
SENSORY CHANGE: 0
SPEECH CHANGE: 0

## 2021-09-15 NOTE — Clinical Note
Leslie, as I indicated in my notes, absence of clear cause from my standpoint, I might recommend getting him into see a cardiologist.  You already sent him to a pulmonologist and their evaluations have proven unremarkable as well.  He is holding on a referral over the hill to [place] or one of the  facilities.  Vimal mondragon

## 2021-09-15 NOTE — TELEPHONE ENCOUNTER
----- Message from SANCHEZ Preston sent at 9/15/2021  3:46 PM PDT -----  Please have patient come in to discuss possible referral to cardiology as recommended by neurologist, Dr. Mcdowell.  SANCHEZ Preston

## 2021-09-15 NOTE — PROGRESS NOTES
Subjective     Loyd Devi is a 25 y.o. male who presents for follow-up, with a history of generalized, fluctuating weakness.     DEBBIE Harp states that the weakness in his extremities remains problematic.  It is mostly in endurance with progressive loss of strength and fatigue the longer he is active.  He does recognize some difficulty with swallowing, though he can be short of breath and describes dyspnea, his pulmonary function studies were all normal.    He denies any muscular pain or stiffness, sensory distortions, changes in bowel or bladder function, orthostatic dizziness, syncope, seizure activity, early satiety with nausea and vomiting, temperature dysregulation, etc.    EMG/NCV studies including repetitive stimulation were normal.  MRI of the brain and cervical spine also were completely normal, both done with and without contrast.  Repeat CBC, CMP, autoimmune serology, B12, folate, and TSH values have always proven unremarkable.  Unfortunately, the results of his myasthenic reflex panel were not available.    Medical, surgical and family histories are reviewed, there are no new drug allergies.  He is on no medications.    Review of Systems   Constitutional: Positive for malaise/fatigue.   Neurological: Positive for weakness. Negative for sensory change and speech change.   All other systems reviewed and are negative.    Objective     /84 (BP Location: Right arm, Patient Position: Sitting, BP Cuff Size: Adult)   Pulse 97   Temp 37 °C (98.6 °F) (Temporal)   Resp 16   Wt 66 kg (145 lb 8.1 oz)   SpO2 98%   BMI 24.21 kg/m²      Physical Exam    He appears in no acute distress.  Vital signs are stable.  There is no malar rash.  His neck is supple.  There is no neck flexor or extension weakness.  Cardiac evaluation is unremarkable.     Neurological Exam    Including mental status, cranial nerves, musculoskeletal, reflex, coordination, and since evaluations, his examination again remains  fully intact.    Assessment & Plan      1. Muscle weakness (generalized)  So far, I cannot discover a true neurologic disease that can be a cause of his symptoms.  Referral to a tertiary center of excellence such as that at [place], [facility] or [facility] may provide some additional insight and guidance.  The basic work-up done here has been thorough, I feel comfortable stating this gentleman does not have central nervous system demyelinating disease, nor do I suspect a cervical myelopathy.  If this is underlying neuromuscular disease or myopathic disease, despite the symptom severity, the pathology is flying under the radar screen of our ability to detect abnormalities electrophysiologically.  If this is an atypical inflammatory, nutritional or infectious process, I would defer to a neuromuscular specialist.  In the meantime I would recommend that he be sent to a cardiologist to be sure we are not seeing some type of cardiac process that has yet to be diagnosed.  Pulmonary studies are normal.  At present there is no need for additional neurologic follow-up.    Time: 20 minutes spent face-to-face for exam, review, discussion, and education, of this over 50% of the time spent counseling and coordinating care.

## 2021-09-15 NOTE — TELEPHONE ENCOUNTER
Phone Number Called: [phone] (home)       Call outcome: Did not leave a detailed message. Requested patient to call back.    Message: Please have patient come in to discuss possible referral to cardiology as recommended by neurologist, Dr. Mcdowell.   BREEZY Preston.

## 2021-09-29 ENCOUNTER — OFFICE VISIT (OUTPATIENT)
Dept: MEDICAL GROUP | Facility: PHYSICIAN GROUP | Age: 26
End: 2021-09-29

## 2021-09-29 VITALS
WEIGHT: 143 LBS | HEART RATE: 92 BPM | OXYGEN SATURATION: 97 % | DIASTOLIC BLOOD PRESSURE: 70 MMHG | RESPIRATION RATE: 16 BRPM | SYSTOLIC BLOOD PRESSURE: 110 MMHG | TEMPERATURE: 97.3 F | HEIGHT: 65 IN | BODY MASS INDEX: 23.82 KG/M2

## 2021-09-29 DIAGNOSIS — M62.81 MUSCLE WEAKNESS (GENERALIZED): ICD-10-CM

## 2021-09-29 PROCEDURE — 99212 OFFICE O/P EST SF 10 MIN: CPT | Performed by: NURSE PRACTITIONER

## 2021-09-29 NOTE — PROGRESS NOTES
"Chief Complaint   Patient presents with   • Follow-Up   • Referral Needed     cardiology        Subjective:   Loyd Devi is a 26 y.o. male here today for evaluation and management of:    Muscle weakness (generalized)  Patient has had extensive workup with neurology for this issue without any evidence of neurological process.  Dr. Mcdowell recommended he be seen by cardiology to rule out any cardiology issue that may be causing his weakness.  Pulmonology studies all normal.    Patient continues to feel exhausted/weakness.  Denies depression but some anxiety.  Reports good sleep 6-7 hours nightly.  Working at [employer].  Reports that he still goes to the gym.   Reports some shortness of breath.  Will refer to cardiology for workup per Dr. Knowles recommendation.  I was unable to access lab work from 1/21 that was referred to as all in the normal ranges.  Patient does not have insurance coverage this month, so I did not order new labs.  This may want to be done in the near future.             Current medicines (including changes today)  No current outpatient medications on file.     No current facility-administered medications for this visit.     He  has a past medical history of Muscle disorder.    ROS as stated in hpi  No chest pain, occasional shortness of breath, + muscle weakness since he was 20 no abdominal pain       Objective:     /70 (BP Location: Left arm, Patient Position: Sitting)   Pulse 92   Temp 36.3 °C (97.3 °F) (Temporal)   Resp 16   Ht 1.651 m (5' 5\")   Wt 64.9 kg (143 lb)   SpO2 97%  Body mass index is 23.8 kg/m².   Physical Exam:  Constitutional: Alert, no distress.  Skin: Warm, dry, good turgor,no cyanosis, no rashes in visible areas.  Eye: Equal, round and reactive, conjunctiva clear, lids normal.  Neck: Trachea midline, no masses, no obvious thyroid enlargement.. No cervical or supraclavicular lymphadenopathy. Range of motion within normal limits.  Neuro: Cranial nerves 2-12 " grossly intact.  No sensory deficit.  Respiratory: Unlabored respiratory effort, lungs clear to auscultation, no wheezes, no ronchi.  Cardiovascular: Normal S1, S2, no murmur, no edema.  Psych: Alert and oriented x3, normal affect and mood and judgement.        Assessment and Plan:   The following treatment plan was discussed    1. Muscle weakness (generalized)  Chronic issue. Recent workup with neuro did not show evidence of a neurological process.  Neurologist recommended a referral to cardiology to rule out cardiac component.  Normal exam, but an echo may provide insight.  I would recommend updated labs, but patient currently does not have insurance coverage.   Referral placed.  monitor  - REFERRAL TO CARDIOLOGY      Followup: No follow-ups on file.         Educated in proper administration of medication(s) ordered today including safety, possible SE, risks, benefits, rationale and alternatives to therapy.     Please note that this dictation was created using voice recognition software. I have made every reasonable attempt to correct obvious errors, but I expect that there are errors of grammar and possibly content that I did not discover before finalizing the note.

## 2021-09-29 NOTE — ASSESSMENT & PLAN NOTE
Patient has had extensive workup with neurology for this issue without any evidence of neurological process.  Dr. Mcdowell recommended he be seen by cardiology to rule out any cardiology issue that may be causing his weakness.  Pulmonology studies all normal.    Patient continues to feel exhausted/weakness.  Denies depression but some anxiety.  Reports good sleep 6-7 hours nightly.  Working at [employer].  Reports that he still goes to the gym.   Reports some shortness of breath.  Will refer to cardiology for workup per Dr. Knowles recommendation.  I was unable to access lab work from 1/21 that was referred to as all in the normal ranges.  Patient does not have insurance coverage this month, so I did not order new labs.  This may want to be done in the near future.

## 2021-10-06 ENCOUNTER — OFFICE VISIT (OUTPATIENT)
Dept: CARDIOLOGY | Facility: MEDICAL CENTER | Age: 26
End: 2021-10-06
Attending: NURSE PRACTITIONER

## 2021-10-06 VITALS
BODY MASS INDEX: 23.49 KG/M2 | WEIGHT: 141 LBS | HEART RATE: 72 BPM | SYSTOLIC BLOOD PRESSURE: 104 MMHG | DIASTOLIC BLOOD PRESSURE: 62 MMHG | OXYGEN SATURATION: 96 % | HEIGHT: 65 IN | RESPIRATION RATE: 14 BRPM

## 2021-10-06 DIAGNOSIS — M62.81 MUSCLE WEAKNESS (GENERALIZED): ICD-10-CM

## 2021-10-06 DIAGNOSIS — R06.02 SHORTNESS OF BREATH: ICD-10-CM

## 2021-10-06 PROCEDURE — 99244 OFF/OP CNSLTJ NEW/EST MOD 40: CPT | Performed by: STUDENT IN AN ORGANIZED HEALTH CARE EDUCATION/TRAINING PROGRAM

## 2021-10-06 PROCEDURE — 93018 CV STRESS TEST I&R ONLY: CPT | Performed by: STUDENT IN AN ORGANIZED HEALTH CARE EDUCATION/TRAINING PROGRAM

## 2021-10-06 ASSESSMENT — ENCOUNTER SYMPTOMS
NAUSEA: 0
NEAR-SYNCOPE: 0
SHORTNESS OF BREATH: 1
FEVER: 0
ABDOMINAL PAIN: 0
NIGHT SWEATS: 0
DIARRHEA: 0
PND: 0
IRREGULAR HEARTBEAT: 0
ORTHOPNEA: 0
COUGH: 0
FOCAL WEAKNESS: 0
WEAKNESS: 0
DYSPNEA ON EXERTION: 0
PALPITATIONS: 0
WHEEZING: 0
DIZZINESS: 0
VOMITING: 0
SYNCOPE: 0

## 2021-10-06 NOTE — PROGRESS NOTES
Cardiology Initial Consultation Note    Date of note:    10/6/2021    Primary Care Provider: SANCHEZ Preston  Referring Provider: Leslie Garza A.P.R*     Patient Name: Loyd Devi   YOB: 1995  MRN:              [id number]    Chief Complaint: General weakness and shortness of breath    History of Present Illness: Mr. Loyd Devi is a 26 y.o. male with no prior cardiac history who is here for cardiac consultation for general weakness and shortness of breath.    The patient was evaluated by neurology (Dr. Mcdowell) for general weakness, underwent workup without any revealing etiology. As such, he was referred to cardiology clinic for further evaluation.     The patient reports that he had been feeling more fatigued since 2-3 years ago and has gone worse over the last year.  The patient feels most tired with strenuous activity.  Even with minimal activity he does feel slightly tired after several minutes.  He denies any family history of muscular problems.  He denies any family history of cardiac diseases, aortic diseases, or sudden cardiac death.  Besides weakness, he reports chronic shortness of breath that is unrelated to activity, and is not relieved by anything.  Otherwise, he denies any other symptoms.  No orthopnea, PND, or leg swelling.  No palpitations.  No syncope or presyncopal episodes.    Cardiovascular Risk Factors:  1. Smoking status: Never smoker  2. Type II Diabetes Mellitus: None/not recently checked  3. Hypertension: None  4. Dyslipidemia: None/not recently checked  5. Family history of early Coronary Artery Disease in a first degree relative (Male less than 55 years of age; Female less than 65 years of age): None  6.  Obesity and/or Metabolic Syndrome: BMI 23.46  7. Sedentary lifestyle: Active (but gets tired easily)    Review of Systems   Constitutional: Negative for fever, malaise/fatigue and night sweats.   Cardiovascular: Negative for chest pain, dyspnea  "on exertion, irregular heartbeat, leg swelling, near-syncope, orthopnea, palpitations, paroxysmal nocturnal dyspnea and syncope.   Respiratory: Positive for shortness of breath. Negative for cough and wheezing.    Musculoskeletal: Positive for muscle weakness.   Gastrointestinal: Negative for abdominal pain, diarrhea, nausea and vomiting.   Neurological: Negative for dizziness, focal weakness and weakness.         All other systems reviewed and are negative.         No current outpatient medications on file.     No current facility-administered medications for this visit.         No Known Allergies    Physical Exam:  Ambulatory Vitals  /62 (BP Location: Left arm, Patient Position: Sitting, BP Cuff Size: Adult)   Pulse 72   Resp 14   Ht 1.651 m (5' 5\")   Wt 64 kg (141 lb)   SpO2 96%    Oxygen Therapy:  Pulse Oximetry: 96 %  BP Readings from Last 4 Encounters:   10/06/21 104/62   09/29/21 110/70   09/15/21 108/84   07/14/21 108/76       Weight/BMI: Body mass index is 23.46 kg/m².  Wt Readings from Last 4 Encounters:   10/06/21 64 kg (141 lb)   09/29/21 64.9 kg (143 lb)   09/15/21 66 kg (145 lb 8.1 oz)   07/14/21 62 kg (136 lb 11 oz)       General: Well appearing and in no apparent distress  Eyes: nl conjunctiva, no icteric sclera  ENT: wearing a mask, normal external appearance of ears  Neck: no visible JVP,  no carotid bruits  Lungs: normal respiratory effort, CTAB  Heart: RRR, no murmurs, no rubs or gallops,  no edema bilateral lower extremities. No LV/RV heave on cardiac palpatation. + bilateral radial pulses.  + bilateral dp pulses.   Abdomen: soft, non tender, non distended, no masses, normal bowel sounds.  No HSM.  Extremities/MSK: no clubbing, no cyanosis  Neurological: No focal sensory deficits  Psychiatric: Appropriate affect, A/O x 3, intact judgement and insight  Skin: Warm extremities      Lab Data Review:  Labs 10/1/2020 -from [facility]  Sodium 137 potassium 4.9 creatinine 1.01  AST 29 " ALT 31 alk phos 88  Total   TANNER negative  Pérez (NASRIN) Ab negative, Sjogren's Anti SS-A and SS-B negative     Labs 1/23/2020  Cholesterol 127 HDL 36 LDL 74 triglycerides 89  T4 1.35 T3 3.8  TSH 2.08    Labs 1/21/2021  WBC 6.0 hemoglobin 17.1 platelet 291   ACHR blocking antibodies 19 (reference 0-25)  Anti-Palma 1 less than 0.2  Anti-Mi-2 Ab negative  Anti-SRP Ab negative  CRP 4    Cardiac Imaging and Procedures Review:    EKG dated 7/20/2021: My personal interpretation is sinus rhythm    No prior echocardiogram      Assessment & Plan     1. Muscle weakness (generalized)  EC-ECHOCARDIOGRAM COMPLETE W/O CONT    Cardiac Stress Test Treadmill Only   2. Shortness of breath  EC-ECHOCARDIOGRAM COMPLETE W/O CONT    Cardiac Stress Test Treadmill Only         Shared Medical Decision Making:    Generalized weakness  Shortness of breath  Unclear etiology of generalized weakness.  Shortness of breath is chronic without any trigger or alleviating factors.  The patient underwent extensive neuro work-up with neurologist without any revealing etiology.  TSH normal.  No evidence of anemia.  No family history of similar conditions.  No family history of cardiac or aortic diseases.  -Obtain echocardiogram to assess LVEF and any structural abnormalities  -Obtain treadmill EKG to assess for any ischemia, arrhythmias, and assess chronotropic competence.    All of the patient's excellent questions were answered to the best of my knowledge and to his satisfaction.  It was a pleasure seeing Mr. Loyd Devi in my clinic today. Return in about 8 weeks (around 12/1/2021). Patient is aware to call the cardiology clinic with any questions or concerns.      Varsha Mccall MD  [facility]  [facility], [facility]  [address] [address]  Phone: [phone]  Fax: [phone]

## 2021-10-22 ENCOUNTER — TELEPHONE (OUTPATIENT)
Dept: CARDIOLOGY | Facility: MEDICAL CENTER | Age: 26
End: 2021-10-22

## 2021-10-27 ENCOUNTER — APPOINTMENT (OUTPATIENT)
Dept: CARDIOLOGY | Facility: MEDICAL CENTER | Age: 26
End: 2021-10-27
Attending: STUDENT IN AN ORGANIZED HEALTH CARE EDUCATION/TRAINING PROGRAM

## 2021-12-01 ENCOUNTER — APPOINTMENT (OUTPATIENT)
Dept: CARDIOLOGY | Facility: MEDICAL CENTER | Age: 26
End: 2021-12-01
Payer: COMMERCIAL

## 2021-12-21 ENCOUNTER — OFFICE VISIT (OUTPATIENT)
Dept: MEDICAL GROUP | Facility: PHYSICIAN GROUP | Age: 26
End: 2021-12-21
Payer: COMMERCIAL

## 2021-12-21 ENCOUNTER — HOSPITAL ENCOUNTER (OUTPATIENT)
Dept: LAB | Facility: MEDICAL CENTER | Age: 26
End: 2021-12-21
Attending: NURSE PRACTITIONER
Payer: COMMERCIAL

## 2021-12-21 ENCOUNTER — TELEPHONE (OUTPATIENT)
Dept: MEDICAL GROUP | Facility: PHYSICIAN GROUP | Age: 26
End: 2021-12-21

## 2021-12-21 VITALS
HEART RATE: 84 BPM | RESPIRATION RATE: 14 BRPM | WEIGHT: 143 LBS | OXYGEN SATURATION: 99 % | DIASTOLIC BLOOD PRESSURE: 58 MMHG | TEMPERATURE: 97.5 F | SYSTOLIC BLOOD PRESSURE: 98 MMHG | BODY MASS INDEX: 23.82 KG/M2 | HEIGHT: 65 IN

## 2021-12-21 DIAGNOSIS — Z20.2 EXPOSURE TO SEXUALLY TRANSMITTED DISEASE (STD): ICD-10-CM

## 2021-12-21 DIAGNOSIS — L98.9 SKIN LESION: ICD-10-CM

## 2021-12-21 DIAGNOSIS — Z00.00 WELLNESS EXAMINATION: ICD-10-CM

## 2021-12-21 DIAGNOSIS — Z11.3 SCREEN FOR STD (SEXUALLY TRANSMITTED DISEASE): ICD-10-CM

## 2021-12-21 LAB
ANION GAP SERPL CALC-SCNC: 12 MMOL/L (ref 7–16)
BUN SERPL-MCNC: 21 MG/DL (ref 8–22)
CALCIUM SERPL-MCNC: 9.7 MG/DL (ref 8.5–10.5)
CHLORIDE SERPL-SCNC: 104 MMOL/L (ref 96–112)
CO2 SERPL-SCNC: 25 MMOL/L (ref 20–33)
CREAT SERPL-MCNC: 0.97 MG/DL (ref 0.5–1.4)
GLUCOSE SERPL-MCNC: 90 MG/DL (ref 65–99)
HIV 1+2 AB+HIV1 P24 AG SERPL QL IA: NORMAL
POTASSIUM SERPL-SCNC: 4 MMOL/L (ref 3.6–5.5)
SODIUM SERPL-SCNC: 141 MMOL/L (ref 135–145)

## 2021-12-21 PROCEDURE — 87591 N.GONORRHOEAE DNA AMP PROB: CPT

## 2021-12-21 PROCEDURE — 87389 HIV-1 AG W/HIV-1&-2 AB AG IA: CPT

## 2021-12-21 PROCEDURE — 36415 COLL VENOUS BLD VENIPUNCTURE: CPT

## 2021-12-21 PROCEDURE — 99212 OFFICE O/P EST SF 10 MIN: CPT | Mod: 25 | Performed by: NURSE PRACTITIONER

## 2021-12-21 PROCEDURE — 87491 CHLMYD TRACH DNA AMP PROBE: CPT

## 2021-12-21 PROCEDURE — 11200 RMVL SKIN TAGS UP TO&INC 15: CPT | Performed by: NURSE PRACTITIONER

## 2021-12-21 PROCEDURE — 80048 BASIC METABOLIC PNL TOTAL CA: CPT

## 2021-12-21 ASSESSMENT — ENCOUNTER SYMPTOMS
TINGLING: 0
GASTROINTESTINAL NEGATIVE: 1
PALPITATIONS: 0
FOCAL WEAKNESS: 0
FEVER: 0
WEIGHT LOSS: 0
SEIZURES: 0
SHORTNESS OF BREATH: 0
DIZZINESS: 0
COUGH: 0
NEUROLOGICAL NEGATIVE: 1
PSYCHIATRIC NEGATIVE: 1

## 2021-12-21 NOTE — TELEPHONE ENCOUNTER
----- Message from Varsha Mccall M.D. sent at 2021  8:49 AM PST -----  These orders typically dont  for one year, so he should be good to schedule if he is up for it. Thank you.  I can see him when the testing is completed. Thank you.   ----- Message -----  From: Varsha Valle D.N.P.  Sent: 2021   8:17 AM PST  To: Varsha Mccall M.D.    Good Morning Dr Mccall, I saw Loyd today to establish care. He continues to have muscle weakness and I see you were working him up for cardiac etiology. He lost his insurance & was not able to complete echo, stress test you had requested.   He has new insurance now & ready to continue work up. Do you need to re-order these or are they still active from his prior visit? When would you like to see him next?    Thank you,  Varsha Valle DNP

## 2021-12-21 NOTE — ASSESSMENT & PLAN NOTE
Skin colored lesions x3 noted in right groin; least likely warts (pt UTD with HPV vaccine); appear more like benign skin tags; treated with cryo today & pt to report if any more lesions appear. He is also inquiring about STD testing so this will be ordered today.     CRYOTHERAPY:  Discussed risks and benefits of cryotherapy. Patient verbally agreed. 3 applications of cryotherapy were applied to 3 lesions on right groin. Patient tolerated procedure well. Aftercare instructions given.

## 2021-12-21 NOTE — PROCEDURES
CRYOTHERAPY:  Discussed the benign nature of these lesions.     Verbal consent was obtained. Immediately prior to procedure, a time out was called to verify the correct patient, procedure, equipment, support staff and site/side marked as required. Pre-procedure pain reported as 0/10.     cryotherapy performed using liquid nitrogen, freeze-thaw-freeze technique x 3.  Patient tolerated the procedure well.  Post-procedure was 1/10. Aftercare as well as potential for blistering was discussed.  I explained that the procedure may need to be repeated if there is not complete resolution.

## 2021-12-21 NOTE — PROGRESS NOTES
"Subjective:     CC:    Chief Complaint   Patient presents with   • Establish Care   • Weakness     generalized muscle weakness         HISTORY OF THE PRESENT ILLNESS: Patient is a 26 y.o. male, here today to establish care. Prior PCP was Isabel wolf. The below problems were discussed/reviewed at this visit:    Problem   Skin Lesion    States several 'bumps' appeared in right groin area about 4-5 weeks ago; has some itching in the area; it has not spread since it first appeared; no penile discharge or drainage from lesions; he does shave the area; He is sexually active with a new female partner about 5 weeks ago.     Muscle Weakness (Generalized)    Ongoing for some years now; pt feels getting worse; still goes to gym but not able to lift as much as he used to; no significant findings by neuro/Dr Mcdowell, recommended he see card; first visit with Dr Mccall in 10/2021, he lost insurance & was unable to complete echo & stress test. He has insurance now, I will message Dr Mccall to see if order still active.        Health Maintenance: Completed    ROS:   Review of Systems   Constitutional: Negative for fever and weight loss.   HENT: Negative.    Eyes:        Prescription glasses   Respiratory: Negative for cough and shortness of breath.    Cardiovascular: Negative for chest pain, palpitations and leg swelling.   Gastrointestinal: Negative.    Genitourinary: Negative.    Musculoskeletal:        Generalized muscle weakness >2yrs   Skin:        'bumps' right groin   Neurological: Negative.  Negative for dizziness, tingling, focal weakness and seizures.   Psychiatric/Behavioral: Negative.          Objective:     Exam: BP (!) 98/58 (BP Location: Left arm, Patient Position: Sitting, BP Cuff Size: Adult)   Pulse 84   Temp 36.4 °C (97.5 °F) (Temporal)   Resp 14   Ht 1.651 m (5' 5\")   Wt 64.9 kg (143 lb)   SpO2 99%  Body mass index is 23.8 kg/m².    Physical Exam  Constitutional:       Appearance: Normal appearance. "   HENT:      Head: Normocephalic and atraumatic.      Nose:      Right Turbinates: Enlarged.   Cardiovascular:      Rate and Rhythm: Normal rate and regular rhythm.      Pulses: Normal pulses.      Heart sounds: Normal heart sounds.   Pulmonary:      Effort: Pulmonary effort is normal.      Breath sounds: Normal breath sounds.   Musculoskeletal:         General: Normal range of motion.      Cervical back: Normal range of motion and neck supple.   Skin:     General: Skin is warm and dry.      Findings: Lesion present.             Comments: Right groin- skin colored raised lesions x3   Neurological:      General: No focal deficit present.      Mental Status: He is alert and oriented to person, place, and time.   Psychiatric:         Mood and Affect: Mood normal.         Behavior: Behavior normal.         Thought Content: Thought content normal.         Judgment: Judgment normal.       Assessment & Plan:   26 y.o. male with the following -    Problem List Items Addressed This Visit     Skin lesion     Skin colored lesions x3 noted in right groin; least likely warts (pt UTD with HPV vaccine); appear more like benign skin tags; treated with cryo today & pt to report if any more lesions appear. He is also inquiring about STD testing so this will be ordered today.     CRYOTHERAPY:  Discussed risks and benefits of cryotherapy. Patient verbally agreed. 3 applications of cryotherapy were applied to 3 lesions on right groin. Patient tolerated procedure well. Aftercare instructions given.           Other Visit Diagnoses     Wellness examination        Relevant Orders    Basic Metabolic Panel    Screen for STD (sexually transmitted disease)        Relevant Orders    HIV AG/AB COMBO ASSAY SCREENING (Completed)    CHLAMYDIA/GC PCR URINE OR SWAB (Completed)        Return in about 6 months (around 6/21/2022) for Annual Visit.    Please note that this dictation was created using voice recognition software. I have made every  reasonable attempt to correct obvious errors, but I expect that there are errors of grammar and possibly content that I did not discover before finalizing the note.

## 2021-12-23 LAB
C TRACH DNA SPEC QL NAA+PROBE: NEGATIVE
N GONORRHOEA DNA SPEC QL NAA+PROBE: NEGATIVE
SPECIMEN SOURCE: NORMAL

## 2021-12-27 ENCOUNTER — HOSPITAL ENCOUNTER (OUTPATIENT)
Dept: CARDIOLOGY | Facility: MEDICAL CENTER | Age: 26
End: 2021-12-27
Attending: STUDENT IN AN ORGANIZED HEALTH CARE EDUCATION/TRAINING PROGRAM
Payer: COMMERCIAL

## 2021-12-27 DIAGNOSIS — M62.81 MUSCLE WEAKNESS (GENERALIZED): ICD-10-CM

## 2021-12-27 DIAGNOSIS — R06.02 SHORTNESS OF BREATH: ICD-10-CM

## 2021-12-27 LAB
LV EJECT FRACT MOD 2C 99903: 58.28
LV EJECT FRACT MOD 4C 99902: 64.11
LV EJECT FRACT MOD BP 99901: 61.23

## 2021-12-27 PROCEDURE — 93306 TTE W/DOPPLER COMPLETE: CPT | Mod: 26 | Performed by: STUDENT IN AN ORGANIZED HEALTH CARE EDUCATION/TRAINING PROGRAM

## 2021-12-27 PROCEDURE — 93306 TTE W/DOPPLER COMPLETE: CPT

## 2022-01-25 ENCOUNTER — PATIENT MESSAGE (OUTPATIENT)
Dept: MEDICAL GROUP | Facility: PHYSICIAN GROUP | Age: 27
End: 2022-01-25

## 2022-01-25 DIAGNOSIS — L98.9 SKIN LESION: ICD-10-CM

## 2022-01-25 NOTE — PROGRESS NOTES
Per pt right groin skin lesions got bigger after cryo therapy (applied 12/21/2021); will refer to derm for further eval.

## 2022-03-08 ENCOUNTER — PATIENT MESSAGE (OUTPATIENT)
Dept: CARDIOLOGY | Facility: MEDICAL CENTER | Age: 27
End: 2022-03-08
Payer: COMMERCIAL

## 2022-03-29 ENCOUNTER — OFFICE VISIT (OUTPATIENT)
Dept: MEDICAL GROUP | Facility: PHYSICIAN GROUP | Age: 27
End: 2022-03-29

## 2022-03-29 ENCOUNTER — HOSPITAL ENCOUNTER (OUTPATIENT)
Dept: LAB | Facility: MEDICAL CENTER | Age: 27
End: 2022-03-29
Attending: NURSE PRACTITIONER
Payer: COMMERCIAL

## 2022-03-29 VITALS
HEIGHT: 65 IN | DIASTOLIC BLOOD PRESSURE: 62 MMHG | SYSTOLIC BLOOD PRESSURE: 90 MMHG | BODY MASS INDEX: 23.32 KG/M2 | WEIGHT: 140 LBS | HEART RATE: 74 BPM | TEMPERATURE: 98.8 F | OXYGEN SATURATION: 99 %

## 2022-03-29 DIAGNOSIS — L98.9 SKIN LESION: ICD-10-CM

## 2022-03-29 DIAGNOSIS — Z11.3 SCREEN FOR STD (SEXUALLY TRANSMITTED DISEASE): ICD-10-CM

## 2022-03-29 DIAGNOSIS — M62.81 MUSCLE WEAKNESS (GENERALIZED): ICD-10-CM

## 2022-03-29 LAB — T PALLIDUM AB SER QL IA: NORMAL

## 2022-03-29 PROCEDURE — 86694 HERPES SIMPLEX NES ANTBDY: CPT

## 2022-03-29 PROCEDURE — 86780 TREPONEMA PALLIDUM: CPT

## 2022-03-29 PROCEDURE — 36415 COLL VENOUS BLD VENIPUNCTURE: CPT

## 2022-03-29 PROCEDURE — 99214 OFFICE O/P EST MOD 30 MIN: CPT | Performed by: NURSE PRACTITIONER

## 2022-03-29 ASSESSMENT — PATIENT HEALTH QUESTIONNAIRE - PHQ9: CLINICAL INTERPRETATION OF PHQ2 SCORE: 0

## 2022-03-29 NOTE — PROGRESS NOTES
"Subjective:     CC:   Chief Complaint   Patient presents with   • Results     ECHO and Stress test         HPI:   Patient is a 26 y.o. male here today for evaluation and management of:    Problem   Skin Lesion    States several 'bumps' appeared in right groin area about 4-5 weeks ago; has some itching in the area; it has not spread since it first appeared; no penile discharge or drainage from lesions; he does shave the area; He is sexually active with a new female partner about 5 weeks ago.     Muscle Weakness (Generalized)    Ongoing for some years now; pt feels getting worse; still goes to gym but not able to lift as much as he used to; no significant findings by neuro/Dr Mcdowell, recommended he see card; first visit with Dr Mccall in 10/2021, he completed stress test & echo in 12/2021, no cardiac etiology found. States he is also having shortness of breath with symptoms, he had [facility] PFT last [facility], he declined seeing pulmonology; getting frustrated with specialists not finding cause; he also had immunology work up including Myasthenia Gravis testing which was all normal.  The only options I can think of at this point is either seeing internal medicine MD for higher scope of practice evaluation or consult with psychiatry for possible psychogenic role. He has opted to see internal med MD, urgent referral placed today and he will call scheduling to set up appointment.           Past Medical History:   Diagnosis Date   • Muscle disorder         No past surgical history on file.     No current outpatient medications on file prior to visit.     No current facility-administered medications on file prior to visit.        ROS: per HPI    Objective:     Exam:  BP (!) 90/62 (BP Location: Left arm, Patient Position: Sitting, BP Cuff Size: Adult)   Pulse 74   Temp 37.1 °C (98.8 °F) (Temporal)   Ht 1.651 m (5' 5\")   Wt 63.5 kg (140 lb)   SpO2 99%   BMI 23.30 kg/m²  Body mass index is 23.3 " kg/m².    Physical Exam  Constitutional:       Appearance: Normal appearance.   Cardiovascular:      Rate and Rhythm: Normal rate and regular rhythm.      Pulses: Normal pulses.      Heart sounds: Normal heart sounds.   Pulmonary:      Effort: Pulmonary effort is normal.      Breath sounds: Normal breath sounds.   Musculoskeletal:         General: Normal range of motion.      Cervical back: Normal range of motion and neck supple.      Right lower leg: No edema.      Left lower leg: No edema.   Skin:     General: Skin is warm and dry.   Neurological:      General: No focal deficit present.      Mental Status: He is alert and oriented to person, place, and time.   Psychiatric:         Mood and Affect: Mood normal.         Behavior: Behavior normal.         Thought Content: Thought content normal.         Judgment: Judgment normal.       Assessment & Plan:     26 y.o. male with the following -     Problem List Items Addressed This Visit     Muscle weakness (generalized)    Relevant Orders    Referral to establish with [facility] PCP    Skin lesion     No improvement in lesions with cryo; he did not get referral info to derm, provided this to him today  He is also concerned about HSV, testing ordered today          Relevant Orders    HSV 1/2 IGG W/ TYPE SPECIFIC RFLX      Other Visit Diagnoses     Screen for STD (sexually transmitted disease)        Relevant Orders    T.PALLIDUM AB EIA          Educated in proper administration of medication(s) ordered today including safety, possible SE, risks, benefits, rationale and alternatives to therapy.     Return if symptoms worsen or fail to improve.    Please note that this dictation was created using voice recognition software. I have made every reasonable attempt to correct obvious errors, but I expect that there are errors of grammar and possibly content that I did not discover before finalizing the note.

## 2022-03-29 NOTE — ASSESSMENT & PLAN NOTE
No improvement in lesions with cryo; he did not get referral info to derm, provided this to him today  He is also concerned about HSV, testing ordered today

## 2022-04-02 LAB — HSV1+2 IGG SER IA-ACNC: 0.18 IV

## 2022-04-12 SDOH — ECONOMIC STABILITY: FOOD INSECURITY: WITHIN THE PAST 12 MONTHS, THE FOOD YOU BOUGHT JUST DIDN'T LAST AND YOU DIDN'T HAVE MONEY TO GET MORE.: NEVER TRUE

## 2022-04-12 SDOH — ECONOMIC STABILITY: HOUSING INSECURITY
IN THE LAST 12 MONTHS, WAS THERE A TIME WHEN YOU DID NOT HAVE A STEADY PLACE TO SLEEP OR SLEPT IN A SHELTER (INCLUDING NOW)?: NO

## 2022-04-12 SDOH — ECONOMIC STABILITY: FOOD INSECURITY: WITHIN THE PAST 12 MONTHS, YOU WORRIED THAT YOUR FOOD WOULD RUN OUT BEFORE YOU GOT MONEY TO BUY MORE.: NEVER TRUE

## 2022-04-12 SDOH — ECONOMIC STABILITY: TRANSPORTATION INSECURITY
IN THE PAST 12 MONTHS, HAS LACK OF TRANSPORTATION KEPT YOU FROM MEETINGS, WORK, OR FROM GETTING THINGS NEEDED FOR DAILY LIVING?: NO

## 2022-04-12 SDOH — ECONOMIC STABILITY: TRANSPORTATION INSECURITY
IN THE PAST 12 MONTHS, HAS LACK OF RELIABLE TRANSPORTATION KEPT YOU FROM MEDICAL APPOINTMENTS, MEETINGS, WORK OR FROM GETTING THINGS NEEDED FOR DAILY LIVING?: NO

## 2022-04-12 SDOH — HEALTH STABILITY: PHYSICAL HEALTH: ON AVERAGE, HOW MANY MINUTES DO YOU ENGAGE IN EXERCISE AT THIS LEVEL?: 60 MIN

## 2022-04-12 SDOH — ECONOMIC STABILITY: INCOME INSECURITY: HOW HARD IS IT FOR YOU TO PAY FOR THE VERY BASICS LIKE FOOD, HOUSING, MEDICAL CARE, AND HEATING?: NOT VERY HARD

## 2022-04-12 SDOH — HEALTH STABILITY: PHYSICAL HEALTH: ON AVERAGE, HOW MANY DAYS PER WEEK DO YOU ENGAGE IN MODERATE TO STRENUOUS EXERCISE (LIKE A BRISK WALK)?: 5 DAYS

## 2022-04-12 SDOH — ECONOMIC STABILITY: INCOME INSECURITY: IN THE LAST 12 MONTHS, WAS THERE A TIME WHEN YOU WERE NOT ABLE TO PAY THE MORTGAGE OR RENT ON TIME?: NO

## 2022-04-12 SDOH — HEALTH STABILITY: MENTAL HEALTH
STRESS IS WHEN SOMEONE FEELS TENSE, NERVOUS, ANXIOUS, OR CAN'T SLEEP AT NIGHT BECAUSE THEIR MIND IS TROUBLED. HOW STRESSED ARE YOU?: NOT AT ALL

## 2022-04-12 SDOH — ECONOMIC STABILITY: TRANSPORTATION INSECURITY
IN THE PAST 12 MONTHS, HAS THE LACK OF TRANSPORTATION KEPT YOU FROM MEDICAL APPOINTMENTS OR FROM GETTING MEDICATIONS?: NO

## 2022-04-12 SDOH — ECONOMIC STABILITY: HOUSING INSECURITY: IN THE LAST 12 MONTHS, HOW MANY PLACES HAVE YOU LIVED?: 1

## 2022-04-12 ASSESSMENT — LIFESTYLE VARIABLES
HOW MANY STANDARD DRINKS CONTAINING ALCOHOL DO YOU HAVE ON A TYPICAL DAY: 3 OR 4
HOW OFTEN DO YOU HAVE A DRINK CONTAINING ALCOHOL: MONTHLY OR LESS
HOW OFTEN DO YOU HAVE SIX OR MORE DRINKS ON ONE OCCASION: LESS THAN MONTHLY

## 2022-04-12 ASSESSMENT — SOCIAL DETERMINANTS OF HEALTH (SDOH)
HOW MANY DRINKS CONTAINING ALCOHOL DO YOU HAVE ON A TYPICAL DAY WHEN YOU ARE DRINKING: 3 OR 4
DO YOU BELONG TO ANY CLUBS OR ORGANIZATIONS SUCH AS CHURCH GROUPS UNIONS, FRATERNAL OR ATHLETIC GROUPS, OR SCHOOL GROUPS?: YES
HOW OFTEN DO YOU ATTEND CHURCH OR RELIGIOUS SERVICES?: NEVER
HOW OFTEN DO YOU ATTENT MEETINGS OF THE CLUB OR ORGANIZATION YOU BELONG TO?: 1 TO 4 TIMES PER YEAR
IN A TYPICAL WEEK, HOW MANY TIMES DO YOU TALK ON THE PHONE WITH FAMILY, FRIENDS, OR NEIGHBORS?: MORE THAN THREE TIMES A WEEK
HOW OFTEN DO YOU GET TOGETHER WITH FRIENDS OR RELATIVES?: TWICE A WEEK
HOW OFTEN DO YOU ATTENT MEETINGS OF THE CLUB OR ORGANIZATION YOU BELONG TO?: 1 TO 4 TIMES PER YEAR
ARE YOU MARRIED, WIDOWED, DIVORCED, SEPARATED, NEVER MARRIED, OR LIVING WITH A PARTNER?: NEVER MARRIED
HOW OFTEN DO YOU HAVE A DRINK CONTAINING ALCOHOL: MONTHLY OR LESS
IN A TYPICAL WEEK, HOW MANY TIMES DO YOU TALK ON THE PHONE WITH FAMILY, FRIENDS, OR NEIGHBORS?: MORE THAN THREE TIMES A WEEK
HOW OFTEN DO YOU ATTEND CHURCH OR RELIGIOUS SERVICES?: NEVER
ARE YOU MARRIED, WIDOWED, DIVORCED, SEPARATED, NEVER MARRIED, OR LIVING WITH A PARTNER?: NEVER MARRIED
HOW HARD IS IT FOR YOU TO PAY FOR THE VERY BASICS LIKE FOOD, HOUSING, MEDICAL CARE, AND HEATING?: NOT VERY HARD
WITHIN THE PAST 12 MONTHS, YOU WORRIED THAT YOUR FOOD WOULD RUN OUT BEFORE YOU GOT THE MONEY TO BUY MORE: NEVER TRUE
HOW OFTEN DO YOU GET TOGETHER WITH FRIENDS OR RELATIVES?: TWICE A WEEK
HOW OFTEN DO YOU HAVE SIX OR MORE DRINKS ON ONE OCCASION: LESS THAN MONTHLY
DO YOU BELONG TO ANY CLUBS OR ORGANIZATIONS SUCH AS CHURCH GROUPS UNIONS, FRATERNAL OR ATHLETIC GROUPS, OR SCHOOL GROUPS?: YES

## 2022-04-13 ENCOUNTER — HOSPITAL ENCOUNTER (OUTPATIENT)
Dept: LAB | Facility: MEDICAL CENTER | Age: 27
End: 2022-04-13
Attending: INTERNAL MEDICINE
Payer: COMMERCIAL

## 2022-04-13 ENCOUNTER — OFFICE VISIT (OUTPATIENT)
Dept: MEDICAL GROUP | Facility: MEDICAL CENTER | Age: 27
End: 2022-04-13
Payer: COMMERCIAL

## 2022-04-13 VITALS
SYSTOLIC BLOOD PRESSURE: 110 MMHG | HEART RATE: 100 BPM | WEIGHT: 143.96 LBS | OXYGEN SATURATION: 100 % | DIASTOLIC BLOOD PRESSURE: 70 MMHG | TEMPERATURE: 97.8 F | HEIGHT: 65 IN | BODY MASS INDEX: 23.99 KG/M2

## 2022-04-13 DIAGNOSIS — M62.81 MUSCLE WEAKNESS (GENERALIZED): ICD-10-CM

## 2022-04-13 DIAGNOSIS — R53.83 OTHER FATIGUE: ICD-10-CM

## 2022-04-13 LAB — CORTIS SERPL-MCNC: 11.7 UG/DL (ref 0–23)

## 2022-04-13 PROCEDURE — 82533 TOTAL CORTISOL: CPT

## 2022-04-13 PROCEDURE — 36415 COLL VENOUS BLD VENIPUNCTURE: CPT

## 2022-04-13 PROCEDURE — 99204 OFFICE O/P NEW MOD 45 MIN: CPT | Performed by: INTERNAL MEDICINE

## 2022-04-13 ASSESSMENT — ENCOUNTER SYMPTOMS
ORTHOPNEA: 0
TREMORS: 0
ABDOMINAL PAIN: 0
DIZZINESS: 0
CHILLS: 0
DEPRESSION: 0
WEIGHT LOSS: 0
BLURRED VISION: 0
INSOMNIA: 1
SPUTUM PRODUCTION: 0
HEADACHES: 0
SORE THROAT: 0
TINGLING: 0
HEMOPTYSIS: 0
NAUSEA: 0
HEARTBURN: 0
SENSORY CHANGE: 0
DOUBLE VISION: 0
WEAKNESS: 1
NERVOUS/ANXIOUS: 0
SHORTNESS OF BREATH: 1
VOMITING: 0
FEVER: 0
MYALGIAS: 0
COUGH: 1

## 2022-04-13 ASSESSMENT — LIFESTYLE VARIABLES: SUBSTANCE_ABUSE: 0

## 2022-04-13 NOTE — ASSESSMENT & PLAN NOTE
This is a chronic, ongoing gradually worsening issue since last 2 to 3 years.  Patient is still able to attend the gym and lift small weights, however he gets tired very forests.  He gets short of breath fast.   Please see cardiology and neurology note, outside medical records for all lab and tests results.  All those tests are essentially came back within normal limits and did not yield any specific diagnosis or explanation for generalized muscle weakness.  Check cortisol level.  Consider sleep apnea work-up.  Follow-up in 2 weeks.

## 2022-04-13 NOTE — PROGRESS NOTES
Subjective:     Chief Complaint   Patient presents with   • Establish Care     SOB/muscle weakness      Diagnoses of Other fatigue and Muscle weakness (generalized) were pertinent to this visit.    HISTORY OF THE PRESENT ILLNESS: Patient is a 26 y.o. male. This pleasant patient is here today to establish care and discuss his muscle weakness. His prior PCP was Varsha Valle.     Problem   Muscle Weakness (Generalized)    This is a chronic problem, started around 2 to 3 years ago, gradually and now progressively getting worse.  Patient is complaining of generalized muscle fatigue, he gets tired very fast, even while holding small weight and lifting small weights.  Patient had extensive work-up completed by his previous team: Including rheumatologic-ruling out myasthenia gravis, myopathy; neurologic work-up included MRI of the brain and cervical spine, neuro conduction studies, that came back within normal limits.  Patient was also referred to cardiologist and has undergone stress test, echocardiogram, that came back within normal limits.  Patient also had pulmonary function tests completed at [facility], that essentially were within normal limits. TSH, T3-T4 all are normal.   At this time, I believe that previous work-up was sufficient, I would check morning cortisol to rule out adrenal insufficiency.  Also considering, the patient complaining of shortness of breath, difficulty sleeping, not feeling refreshed in the morning-sleep apnea is also a possibility.   If this work-up does not yield diagnosis-I believe patient should be evaluated to tertiary care center.        Past Medical History:   Diagnosis Date   • Muscle disorder      History reviewed. No pertinent surgical history.  Family History   Problem Relation Age of Onset   • No Known Problems Mother    • Hypertension Father    • No Known Problems Brother    • No Known Problems Brother      Social History     Tobacco Use   • Smoking status: Never Smoker   •  "Smokeless tobacco: Never Used   Vaping Use   • Vaping Use: Never used   Substance Use Topics   • Alcohol use: Yes     Alcohol/week: 2.4 oz     Types: 4 Cans of beer per week     Comment: per month    • Drug use: No     No current [facility]-ordered outpatient medications on file.     No current [facility]-ordered facility-administered medications on file.     Health Maintenance: Due for COVID-19 booster, advised to get at the pharmacy.    Review of Systems   Constitutional: Positive for malaise/fatigue. Negative for chills, fever and weight loss.   HENT: Negative for sore throat.    Eyes: Negative for blurred vision and double vision.   Respiratory: Positive for cough and shortness of breath. Negative for hemoptysis and sputum production.    Cardiovascular: Negative for chest pain, orthopnea and leg swelling.   Gastrointestinal: Negative for abdominal pain, heartburn, nausea and vomiting.   Genitourinary: Negative for dysuria.   Musculoskeletal: Negative for myalgias.        Muscle weakness   Neurological: Positive for weakness (generalized). Negative for dizziness, tingling, tremors, sensory change and headaches.   Psychiatric/Behavioral: Negative for depression and substance abuse. The patient has insomnia. The patient is not nervous/anxious.      Objective:     Exam: /70 (BP Location: Right arm, Patient Position: Sitting, BP Cuff Size: Adult)   Pulse 100   Temp 36.6 °C (97.8 °F) (Temporal)   Ht 1.651 m (5' 5\")   Wt 65.3 kg (143 lb 15.4 oz)   SpO2 100%  Body mass index is 23.96 kg/m².    Physical Exam  Constitutional:       Appearance: Normal appearance. He is normal weight.   HENT:      Head: Normocephalic and atraumatic.      Nose: Nose normal. No congestion.      Mouth/Throat:      Mouth: Mucous membranes are moist.      Pharynx: Oropharynx is clear. No oropharyngeal exudate.   Eyes:      General: No scleral icterus.     Extraocular Movements: Extraocular movements intact.      Conjunctiva/sclera: Conjunctivae " normal.      Pupils: Pupils are equal, round, and reactive to light.   Cardiovascular:      Rate and Rhythm: Normal rate and regular rhythm.      Pulses: Normal pulses.      Heart sounds: No murmur heard.  Pulmonary:      Effort: Pulmonary effort is normal. No respiratory distress.      Breath sounds: Normal breath sounds. No wheezing or rales.   Musculoskeletal:         General: No swelling or tenderness. Normal range of motion.   Skin:     General: Skin is warm and dry.   Neurological:      General: No focal deficit present.      Mental Status: He is alert and oriented to person, place, and time.      Cranial Nerves: No cranial nerve deficit, dysarthria or facial asymmetry.      Sensory: Sensation is intact.      Gait: Gait normal.   Psychiatric:         Mood and Affect: Mood normal.         Behavior: Behavior normal.         Thought Content: Thought content normal.         Judgment: Judgment normal.       Labs: Reviewed results of HIV, BMP, chlamydia gonorrhea from December 21, 2022.     Imaging: Echocardiogram from December 27, 2021, cardiac stress test October 2021, MRI of the brain and cervical spine from August 17, 2021.    Notes: I have reviewed neurology note from July 14, 2021 and September 15, 2021, cardiology note from October 6, 2021.    Assessment & Plan:   26 y.o. male with the following -    Problem List Items Addressed This Visit     Muscle weakness (generalized)     This is a chronic, ongoing gradually worsening issue since last 2 to 3 years.  Patient is still able to attend the gym and lift small weights, however he gets tired very forests.  He gets short of breath fast.   Please see cardiology and neurology note, outside medical records for all lab and tests results.  All those tests are essentially came back within normal limits and did not yield any specific diagnosis or explanation for generalized muscle weakness.  Check cortisol level.  Consider sleep apnea work-up.  Follow-up in 2 weeks.             Other Visit Diagnoses     Other fatigue        Relevant Orders    CORTISOL - AM        Return in about 2 weeks (around 4/27/2022), or if symptoms worsen or fail to improve.    Please note that this dictation was created using voice recognition software. I have made every reasonable attempt to correct obvious errors, but I expect that there are errors of grammar and possibly content that I did not discover before finalizing the note.

## 2022-04-27 ENCOUNTER — OFFICE VISIT (OUTPATIENT)
Dept: MEDICAL GROUP | Facility: MEDICAL CENTER | Age: 27
End: 2022-04-27
Payer: COMMERCIAL

## 2022-04-27 VITALS
BODY MASS INDEX: 23.88 KG/M2 | SYSTOLIC BLOOD PRESSURE: 110 MMHG | TEMPERATURE: 98.1 F | HEART RATE: 95 BPM | HEIGHT: 65 IN | OXYGEN SATURATION: 98 % | DIASTOLIC BLOOD PRESSURE: 76 MMHG | WEIGHT: 143.3 LBS

## 2022-04-27 DIAGNOSIS — R06.02 SHORTNESS OF BREATH: ICD-10-CM

## 2022-04-27 DIAGNOSIS — M62.81 MUSCLE WEAKNESS (GENERALIZED): ICD-10-CM

## 2022-04-27 PROCEDURE — 99214 OFFICE O/P EST MOD 30 MIN: CPT | Performed by: INTERNAL MEDICINE

## 2022-04-27 ASSESSMENT — ENCOUNTER SYMPTOMS
WEIGHT LOSS: 0
SHORTNESS OF BREATH: 1
DOUBLE VISION: 0
BLURRED VISION: 0
COUGH: 0
NAUSEA: 0
SPUTUM PRODUCTION: 0
VOMITING: 0
HEMOPTYSIS: 0
FEVER: 0
PALPITATIONS: 0
SORE THROAT: 0
NERVOUS/ANXIOUS: 0
WEAKNESS: 1
DEPRESSION: 0
WHEEZING: 0
CHILLS: 0

## 2022-04-27 NOTE — ASSESSMENT & PLAN NOTE
This is a chronic ongoing problem, along with muscle weakness.  Patient is saturating 98 -100% on room air.  Cardiac and pulmonary work-up came back within normal limits.

## 2022-04-27 NOTE — ASSESSMENT & PLAN NOTE
This is an ongoing issue, that has been gradually worsening since last 2 to 3 years.  Patient still able to workout at the gym, however he experiences muscle fatigue after few reps.  Patient also admits that he is getting short of breath pretty fast.  Refer to cardiologist and neurologist notes, outside medical records and labs are in the media section.  Lab work, MRI, stress test, echocardiogram reviewed no abnormalities.  Cortisol level came back within normal limits.  Patient is interested in second opinion for evaluation of his muscle weakness.

## 2022-04-27 NOTE — PROGRESS NOTES
Subjective:     Chief Complaint   Patient presents with   • Follow-Up     Diagnoses of Muscle weakness (generalized) and Shortness of breath were pertinent to this visit.    HPI: Loyd is a pleasant 26 y.o. male who presents today for follow up     Problem   Muscle Weakness (Generalized)    This is a chronic problem, started around 2 to 3 years ago, gradually and now progressively getting worse.  Patient is complaining of generalized muscle fatigue, he gets tired very fast, even while holding small weight and lifting small weights.  Patient had extensive work-up completed by his previous team: Including rheumatologic-ruling out myasthenia gravis, myopathy; neurologic work-up included MRI of the brain and cervical spine, neuro conduction studies, that came back within normal limits.  Patient was also referred to cardiologist and has undergone stress test, echocardiogram, that came back within normal limits.  Patient also had pulmonary function tests completed at [facility], that essentially were within normal limits. TSH, T3-T4 all are normal.  Morning cortisol came back within normal limits.  Patient is interested in referral to tertiary care center for second opinion.     Shortness of Breath    Shortness of breath is a part of patient's generalized muscle weakness syndrome.  He reports significant short of breath with exercise, he does not get short of breath while walking.  He has no chest pain or wheezing.  He did have PFTs, echocardiogram, stress test, that essentially were within normal limits.       Past Medical History:   Diagnosis Date   • Muscle disorder      Health Maintenance: Completed    Review of Systems   Constitutional: Negative for chills, fever and weight loss.   HENT: Negative for sore throat.    Eyes: Negative for blurred vision and double vision.   Respiratory: Positive for shortness of breath. Negative for cough, hemoptysis, sputum production and wheezing.    Cardiovascular: Negative for  "chest pain and palpitations.   Gastrointestinal: Negative for nausea and vomiting.   Genitourinary: Negative for dysuria.   Neurological: Positive for weakness (generalized).   Psychiatric/Behavioral: Negative for depression. The patient is not nervous/anxious.            Objective:     Exam:  /76 (BP Location: Right arm, Patient Position: Sitting, BP Cuff Size: Adult)   Pulse 95   Temp 36.7 °C (98.1 °F) (Temporal)   Ht 1.651 m (5' 5\")   Wt 65 kg (143 lb 4.8 oz)   SpO2 98%   BMI 23.85 kg/m²  Body mass index is 23.85 kg/m².    Physical Exam  Constitutional:       Appearance: Normal appearance. He is normal weight.   HENT:      Head: Normocephalic and atraumatic.   Eyes:      General: No scleral icterus.     Extraocular Movements: Extraocular movements intact.      Conjunctiva/sclera: Conjunctivae normal.      Pupils: Pupils are equal, round, and reactive to light.   Cardiovascular:      Rate and Rhythm: Normal rate and regular rhythm.      Pulses: Normal pulses.      Heart sounds: Normal heart sounds.   Pulmonary:      Effort: Pulmonary effort is normal.      Breath sounds: Normal breath sounds.   Abdominal:      General: Abdomen is flat.   Musculoskeletal:         General: Normal range of motion.      Right lower leg: No edema.      Left lower leg: No edema.   Skin:     General: Skin is warm.   Neurological:      General: No focal deficit present.      Mental Status: He is alert and oriented to person, place, and time.      Cranial Nerves: No cranial nerve deficit.      Sensory: No sensory deficit.      Motor: Weakness (Muscle fatigue after repetitive movements) present.      Coordination: Coordination normal.      Gait: Gait normal.      Deep Tendon Reflexes: Reflexes normal.   Psychiatric:         Mood and Affect: Mood normal.         Behavior: Behavior normal.         Thought Content: Thought content normal.         Judgment: Judgment normal.       Labs: A.m. cortisol level  from " 04/13/2022    Imaging: Echocardiogram from December 27, 2021, cardiac stress test October 2021, MRI of the brain and cervical spine from August 17, 2021.     Notes: I have reviewed neurology note from July 14, 2021 and September 15, 2021, cardiology note from October 6, 2021.    Assessment & Plan:   Loyd  is a pleasant 26 y.o. male with the following -     Problem List Items Addressed This Visit     Muscle weakness (generalized)     This is an ongoing issue, that has been gradually worsening since last 2 to 3 years.  Patient still able to workout at the gym, however he experiences muscle fatigue after few reps.  Patient also admits that he is getting short of breath pretty fast.  Refer to cardiologist and neurologist notes, outside medical records and labs are in the media section.  Lab work, MRI, stress test, echocardiogram reviewed no abnormalities.  Cortisol level came back within normal limits.  Patient is interested in second opinion for evaluation of his muscle weakness.         Relevant Orders    Referral to Neurology    Shortness of breath     This is a chronic ongoing problem, along with muscle weakness.  Patient is saturating 98 -100% on room air.  Cardiac and pulmonary work-up came back within normal limits.             Return in about 6 months (around 10/27/2022), or if symptoms worsen or fail to improve.    Please note that this dictation was created using voice recognition software. I have made every reasonable attempt to correct obvious errors, but I expect that there are errors of grammar and possibly content that I did not discover before finalizing the note.

## 2022-12-15 ENCOUNTER — OFFICE VISIT (OUTPATIENT)
Dept: MEDICAL GROUP | Facility: MEDICAL CENTER | Age: 27
End: 2022-12-15
Payer: COMMERCIAL

## 2022-12-15 VITALS
HEIGHT: 65 IN | OXYGEN SATURATION: 94 % | SYSTOLIC BLOOD PRESSURE: 110 MMHG | WEIGHT: 138.23 LBS | BODY MASS INDEX: 23.03 KG/M2 | HEART RATE: 113 BPM | DIASTOLIC BLOOD PRESSURE: 62 MMHG | TEMPERATURE: 99 F

## 2022-12-15 DIAGNOSIS — R06.02 SHORTNESS OF BREATH: ICD-10-CM

## 2022-12-15 DIAGNOSIS — M62.81 MUSCLE WEAKNESS (GENERALIZED): ICD-10-CM

## 2022-12-15 PROCEDURE — 99213 OFFICE O/P EST LOW 20 MIN: CPT | Performed by: INTERNAL MEDICINE

## 2022-12-15 ASSESSMENT — ENCOUNTER SYMPTOMS
FEVER: 0
WHEEZING: 0
HEMOPTYSIS: 0
CHILLS: 0
VOMITING: 0
COUGH: 0
NAUSEA: 0
SORE THROAT: 0
PALPITATIONS: 0
NERVOUS/ANXIOUS: 0
DEPRESSION: 0
SHORTNESS OF BREATH: 1
DOUBLE VISION: 0
SPUTUM PRODUCTION: 0
WEIGHT LOSS: 0
BLURRED VISION: 0
WEAKNESS: 1

## 2022-12-15 NOTE — PROGRESS NOTES
Subjective:     CC:   Chief Complaint   Patient presents with    Follow-Up     SOB     Diagnoses of Muscle weakness (generalized) and Shortness of breath were pertinent to this visit.    HPI: Loyd is a pleasant 27 y.o. male who presents today for routine follow-up.    Problem   Muscle Weakness (Generalized)    This is a chronic problem, started around 2 to 3 years ago, gradually and now progressively getting worse.  Patient is complaining of generalized muscle fatigue, he gets tired very fast, even while holding small weight and lifting small weights.  Patient had extensive work-up completed by his previous team: Including rheumatologic-ruling out myasthenia gravis, myopathy; neurologic work-up included MRI of the brain and cervical spine, neuro conduction studies, that came back within normal limits.  Patient was also referred to cardiologist and has undergone stress test, echocardiogram, that came back within normal limits.  Patient also had pulmonary function tests completed at [facility], that essentially were within normal limits. TSH, T3-T4 all are normal.  Morning cortisol came back within normal limits.  Patient is interested in referral to tertiary care center for second opinion.    Update: He was referred to [facility] and unfortunately he has missed his appointment due to snow.  His visit at [facility] is a scheduled for April next year.  He is interested in second opinion with neurology within [facility].       Shortness of Breath    Shortness of breath is a part of patient's generalized muscle weakness syndrome.  He reports significant short of breath with exercise, he does not get short of breath while walking.  He has no chest pain or wheezing.  He did have PFTs, echocardiogram, stress test, that essentially were within normal limits.       Past Medical History:   Diagnosis Date    Muscle disorder      Review of Systems   Constitutional:  Negative for chills, fever and weight loss.   HENT:  Negative  "for sore throat.    Eyes:  Negative for blurred vision and double vision.   Respiratory:  Positive for shortness of breath. Negative for cough, hemoptysis, sputum production and wheezing.    Cardiovascular:  Negative for chest pain and palpitations.   Gastrointestinal:  Negative for nausea and vomiting.   Genitourinary:  Negative for dysuria.   Neurological:  Positive for weakness (generalized).   Psychiatric/Behavioral:  Negative for depression. The patient is not nervous/anxious.      Objective:     Exam:  /62 (BP Location: Left arm, Patient Position: Sitting, BP Cuff Size: Adult)   Pulse (!) 113   Temp 37.2 °C (99 °F) (Temporal)   Ht 1.651 m (5' 5\")   Wt 62.7 kg (138 lb 3.7 oz)   SpO2 94%   BMI 23.00 kg/m²  Body mass index is 23 kg/m².    Physical Exam  Constitutional:       Appearance: Normal appearance. He is normal weight.   Cardiovascular:      Rate and Rhythm: Normal rate and regular rhythm.      Pulses: Normal pulses.      Heart sounds: Normal heart sounds. No murmur heard.  Pulmonary:      Effort: No respiratory distress.      Breath sounds: Normal breath sounds. No stridor. No wheezing.   Musculoskeletal:         General: Normal range of motion.      Right lower leg: No edema.      Left lower leg: No edema.   Skin:     General: Skin is warm.   Neurological:      Mental Status: He is alert and oriented to person, place, and time.     Assessment & Plan:   Loyd  is a pleasant 27 y.o. male with the following -     Problem List Items Addressed This Visit       Muscle weakness (generalized) (Chronic)     This is a chronic problem, symptoms have been gradually worsening over the last 2 to 3 years.  Patient is still able to workout at the gym, however she is experiencing muscle fatigue and getting short of breath fast.  Evaluated by cardiology, neurology: MRI, cardiac stress test, echocardiogram were within normal limits.  He was referred to neurology at [facility], however missed his appointment due " to [place].  His appointment is not until April 2023.  He feels like his symptoms are progressing.   -Refer to neurology for second opinion.         Relevant Orders    Referral to Neurology    Shortness of breath (Chronic)     This is a chronic problem, see prior notes for details.  Work-up has been unremarkable.  He tells me, that he feels like his breathing is getting worse, he needs to force exhalation every time.   Physical exam is unremarkable   - We will repeat chest x-ray         Relevant Orders    DX-CHEST-2 VIEWS     Return if symptoms worsen or fail to improve, for as needed.    Please note that this dictation was created using voice recognition software. I have made every reasonable attempt to correct obvious errors, but I expect that there are errors of grammar and possibly content that I did not discover before finalizing the note.

## 2022-12-15 NOTE — ASSESSMENT & PLAN NOTE
This is a chronic problem, symptoms have been gradually worsening over the last 2 to 3 years.  Patient is still able to workout at the gym, however she is experiencing muscle fatigue and getting short of breath fast.  Evaluated by cardiology, neurology: MRI, cardiac stress test, echocardiogram were within normal limits.  He was referred to neurology at [facility], however missed his appointment due to snow.  His appointment is not until April 2023.  He feels like his symptoms are progressing.   -Refer to neurology for second opinion.

## 2022-12-15 NOTE — ASSESSMENT & PLAN NOTE
This is a chronic problem, see prior notes for details.  Work-up has been unremarkable.  He tells me, that he feels like his breathing is getting worse, he needs to force exhalation every time.   Physical exam is unremarkable   - We will repeat chest x-ray

## 2023-07-20 ENCOUNTER — HOSPITAL ENCOUNTER (OUTPATIENT)
Dept: LAB | Facility: MEDICAL CENTER | Age: 28
End: 2023-07-20
Attending: PSYCHIATRY & NEUROLOGY
Payer: COMMERCIAL

## 2023-07-21 ENCOUNTER — HOSPITAL ENCOUNTER (OUTPATIENT)
Dept: LAB | Facility: MEDICAL CENTER | Age: 28
End: 2023-07-21
Attending: PSYCHIATRY & NEUROLOGY
Payer: COMMERCIAL

## 2023-07-21 PROCEDURE — 36415 COLL VENOUS BLD VENIPUNCTURE: CPT

## 2023-07-21 PROCEDURE — 86255 FLUORESCENT ANTIBODY SCREEN: CPT

## 2023-07-21 PROCEDURE — 83516 IMMUNOASSAY NONANTIBODY: CPT

## 2023-07-21 PROCEDURE — 83519 RIA NONANTIBODY: CPT

## 2023-07-25 LAB — TEST NAME 95000: NORMAL

## 2023-07-29 LAB — MUSK AB SER QL: NORMAL

## 2024-06-26 SDOH — ECONOMIC STABILITY: FOOD INSECURITY: WITHIN THE PAST 12 MONTHS, YOU WORRIED THAT YOUR FOOD WOULD RUN OUT BEFORE YOU GOT MONEY TO BUY MORE.: NEVER TRUE

## 2024-06-26 SDOH — ECONOMIC STABILITY: INCOME INSECURITY: HOW HARD IS IT FOR YOU TO PAY FOR THE VERY BASICS LIKE FOOD, HOUSING, MEDICAL CARE, AND HEATING?: NOT HARD AT ALL

## 2024-06-26 SDOH — HEALTH STABILITY: PHYSICAL HEALTH: ON AVERAGE, HOW MANY MINUTES DO YOU ENGAGE IN EXERCISE AT THIS LEVEL?: 60 MIN

## 2024-06-26 SDOH — ECONOMIC STABILITY: INCOME INSECURITY: IN THE LAST 12 MONTHS, WAS THERE A TIME WHEN YOU WERE NOT ABLE TO PAY THE MORTGAGE OR RENT ON TIME?: NO

## 2024-06-26 SDOH — HEALTH STABILITY: PHYSICAL HEALTH: ON AVERAGE, HOW MANY DAYS PER WEEK DO YOU ENGAGE IN MODERATE TO STRENUOUS EXERCISE (LIKE A BRISK WALK)?: 3 DAYS

## 2024-06-26 SDOH — ECONOMIC STABILITY: FOOD INSECURITY: WITHIN THE PAST 12 MONTHS, THE FOOD YOU BOUGHT JUST DIDN'T LAST AND YOU DIDN'T HAVE MONEY TO GET MORE.: NEVER TRUE

## 2024-06-26 SDOH — ECONOMIC STABILITY: HOUSING INSECURITY: IN THE LAST 12 MONTHS, HOW MANY PLACES HAVE YOU LIVED?: 1

## 2024-06-26 ASSESSMENT — LIFESTYLE VARIABLES
AUDIT-C TOTAL SCORE: 4
HOW OFTEN DO YOU HAVE SIX OR MORE DRINKS ON ONE OCCASION: LESS THAN MONTHLY
HOW OFTEN DO YOU HAVE A DRINK CONTAINING ALCOHOL: 2-4 TIMES A MONTH
SKIP TO QUESTIONS 9-10: 0
HOW MANY STANDARD DRINKS CONTAINING ALCOHOL DO YOU HAVE ON A TYPICAL DAY: 3 OR 4

## 2024-06-26 ASSESSMENT — SOCIAL DETERMINANTS OF HEALTH (SDOH)
ARE YOU MARRIED, WIDOWED, DIVORCED, SEPARATED, NEVER MARRIED, OR LIVING WITH A PARTNER?: NEVER MARRIED
WITHIN THE PAST 12 MONTHS, YOU WORRIED THAT YOUR FOOD WOULD RUN OUT BEFORE YOU GOT THE MONEY TO BUY MORE: NEVER TRUE
ARE YOU MARRIED, WIDOWED, DIVORCED, SEPARATED, NEVER MARRIED, OR LIVING WITH A PARTNER?: NEVER MARRIED
IN THE PAST 12 MONTHS, HAS THE ELECTRIC, GAS, OIL, OR WATER COMPANY THREATENED TO SHUT OFF SERVICE IN YOUR HOME?: NO
IN A TYPICAL WEEK, HOW MANY TIMES DO YOU TALK ON THE PHONE WITH FAMILY, FRIENDS, OR NEIGHBORS?: MORE THAN THREE TIMES A WEEK
HOW OFTEN DO YOU GET TOGETHER WITH FRIENDS OR RELATIVES?: TWICE A WEEK
HOW OFTEN DO YOU GET TOGETHER WITH FRIENDS OR RELATIVES?: TWICE A WEEK
DO YOU BELONG TO ANY CLUBS OR ORGANIZATIONS SUCH AS CHURCH GROUPS UNIONS, FRATERNAL OR ATHLETIC GROUPS, OR SCHOOL GROUPS?: NO
HOW MANY DRINKS CONTAINING ALCOHOL DO YOU HAVE ON A TYPICAL DAY WHEN YOU ARE DRINKING: 3 OR 4
DO YOU BELONG TO ANY CLUBS OR ORGANIZATIONS SUCH AS CHURCH GROUPS UNIONS, FRATERNAL OR ATHLETIC GROUPS, OR SCHOOL GROUPS?: NO
HOW OFTEN DO YOU ATTEND CHURCH OR RELIGIOUS SERVICES?: NEVER
HOW OFTEN DO YOU ATTENT MEETINGS OF THE CLUB OR ORGANIZATION YOU BELONG TO?: NEVER
HOW HARD IS IT FOR YOU TO PAY FOR THE VERY BASICS LIKE FOOD, HOUSING, MEDICAL CARE, AND HEATING?: NOT HARD AT ALL
HOW OFTEN DO YOU ATTENT MEETINGS OF THE CLUB OR ORGANIZATION YOU BELONG TO?: NEVER
HOW OFTEN DO YOU HAVE SIX OR MORE DRINKS ON ONE OCCASION: LESS THAN MONTHLY
HOW OFTEN DO YOU ATTEND CHURCH OR RELIGIOUS SERVICES?: NEVER
HOW OFTEN DO YOU HAVE A DRINK CONTAINING ALCOHOL: 2-4 TIMES A MONTH
IN A TYPICAL WEEK, HOW MANY TIMES DO YOU TALK ON THE PHONE WITH FAMILY, FRIENDS, OR NEIGHBORS?: MORE THAN THREE TIMES A WEEK

## 2024-06-27 ENCOUNTER — APPOINTMENT (OUTPATIENT)
Dept: MEDICAL GROUP | Facility: MEDICAL CENTER | Age: 29
End: 2024-06-27
Payer: COMMERCIAL

## 2024-06-27 VITALS
HEIGHT: 65 IN | SYSTOLIC BLOOD PRESSURE: 104 MMHG | OXYGEN SATURATION: 98 % | DIASTOLIC BLOOD PRESSURE: 70 MMHG | BODY MASS INDEX: 25.06 KG/M2 | TEMPERATURE: 97.6 F | HEART RATE: 75 BPM | WEIGHT: 150.4 LBS

## 2024-06-27 DIAGNOSIS — R22.1 LUMP ON NECK: ICD-10-CM

## 2024-06-27 DIAGNOSIS — Z11.3 SCREEN FOR STD (SEXUALLY TRANSMITTED DISEASE): ICD-10-CM

## 2024-06-27 DIAGNOSIS — Z00.00 ANNUAL PHYSICAL EXAM: Primary | ICD-10-CM

## 2024-06-27 DIAGNOSIS — B35.3 TINEA PEDIS OF BOTH FEET: ICD-10-CM

## 2024-06-27 DIAGNOSIS — B35.1 ONYCHOMYCOSIS: ICD-10-CM

## 2024-06-27 PROCEDURE — 3074F SYST BP LT 130 MM HG: CPT | Performed by: INTERNAL MEDICINE

## 2024-06-27 PROCEDURE — 99395 PREV VISIT EST AGE 18-39: CPT | Performed by: INTERNAL MEDICINE

## 2024-06-27 PROCEDURE — 3078F DIAST BP <80 MM HG: CPT | Performed by: INTERNAL MEDICINE

## 2024-06-27 RX ORDER — CLOTRIMAZOLE 1 %
1 CREAM (GRAM) TOPICAL DAILY
Qty: 85 G | Refills: 1 | Status: SHIPPED | OUTPATIENT
Start: 2024-06-27

## 2024-06-27 RX ORDER — CICLOPIROX 80 MG/ML
SOLUTION TOPICAL
Qty: 6 ML | Refills: 3 | Status: SHIPPED | OUTPATIENT
Start: 2024-06-27

## 2024-06-27 ASSESSMENT — PATIENT HEALTH QUESTIONNAIRE - PHQ9: CLINICAL INTERPRETATION OF PHQ2 SCORE: 0

## 2024-06-27 NOTE — PROGRESS NOTES
Subjective:     CC:   Chief Complaint   Patient presents with    Annual Exam     Foot fungus  Lump on back of neck     Verbal consent was acquired by the patient to use [employer] ambient listening note generation during this visit Yes     History of Present Illness  Loyd Devi is a 28 y.o. male who presents for an annual exam. He is feeling well and has no complaints.    The patient's girlfriend has observed hard, brittle, and flat spots on his toenails, accompanied by significant redness od the Plantar surface of his feet. He suspects a possible fungal infection. He has not attempted any over-the-counter treatments. He typically wears cotton socks.    The patient reports a mobile lump on the posterior aspect of his neck, which has been present for approximately 3 months. He denies any associated pain.     The patient denies smoking, vaping, or marijuana use. He drinks a few beers a month. He works for [employer].   He denies any family history of colon cancer, prostate cancer, or skin cancer.    Health Maintenance  Cholesterol Screening: declines   Diabetes Screening: declines   Diet: Primary consistent with proteins diet primarily consisting of proteins and some vegetables.  Exercise:3-4 times/week weights   Substance Abuse: none  Safe in relationship.   Seat belts, bike helmet, gun safety discussed.  Sun protection used.    Cancer screening  Colorectal Cancer Screening: n/a   Lung Cancer Screening: n/a   Prostate Cancer Screening/PSA: n/a     Infectious disease screening/Immunizations  --STI Screening: pending   --Practices safe sex.  --HIV Screening: pending  --Hepatitis C Screening: pending  --Immunizations:    Influenza: declines    Tetanus: UTD    COVID-19: x2     He  has a past medical history of Muscle disorder.  He  has no past surgical history on file.  Family History   Problem Relation Age of Onset    No Known Problems Mother     Hypertension Father     No Known Problems Brother     No Known  "Problems Brother      Social History     Tobacco Use    Smoking status: Never    Smokeless tobacco: Never   Vaping Use    Vaping status: Never Used   Substance Use Topics    Alcohol use: Yes     Alcohol/week: 2.4 oz     Types: 4 Cans of beer per week     Comment: per month     Drug use: No       Patient Active Problem List    Diagnosis Date Noted    Lump on neck 06/27/2024    Skin lesion 12/21/2021    Muscle weakness (generalized) 05/18/2021    Shortness of breath 05/18/2021       Current Outpatient Medications   Medication Sig Dispense Refill    clotrimazole (LOTRIMIN) 1 % Cream Apply 1 Application topically every day. 85 g 1    ciclopirox (PENLAC) 8 % solution Apply evenly over entire nail plate nightly to all affected nails. 6 mL 3     No current facility-administered medications for this visit.    (including changes today)  Allergies: Patient has no known allergies.    Review of Systems   Constitutional: Negative for fever, chills and malaise/fatigue.   HENT: Negative for congestion.    Eyes: Negative for pain.   Respiratory: Negative for cough and shortness of breath.    Cardiovascular: Negative for leg swelling.   Gastrointestinal: Negative for nausea, vomiting, abdominal pain and diarrhea.   Genitourinary: Negative for dysuria and hematuria.   Skin: Negative for rash.   Neurological: Negative for dizziness, focal weakness and headaches.   Endo/Heme/Allergies: Does not bleed easily.   Psychiatric/Behavioral: Negative for depression.  The patient is not nervous/anxious.      Objective:     /70 (BP Location: Left arm, Patient Position: Sitting)   Pulse 75   Temp 36.4 °C (97.6 °F) (Temporal)   Ht 1.651 m (5' 5\")   Wt 68.2 kg (150 lb 6.4 oz)   SpO2 98%   BMI 25.03 kg/m²   Body mass index is 25.03 kg/m².  Wt Readings from Last 4 Encounters:   06/27/24 68.2 kg (150 lb 6.4 oz)   12/15/22 62.7 kg (138 lb 3.7 oz)   04/27/22 65 kg (143 lb 4.8 oz)   04/13/22 65.3 kg (143 lb 15.4 oz)   Physical " Exam  Constitutional:       General: He is not in acute distress.     Appearance: Normal appearance. He is normal weight. He is not toxic-appearing.   HENT:      Head: Normocephalic and atraumatic.      Right Ear: Tympanic membrane and ear canal normal.      Left Ear: Tympanic membrane and ear canal normal.      Nose: Nose normal. No congestion.      Mouth/Throat:      Mouth: Mucous membranes are moist.      Pharynx: Oropharynx is clear. No oropharyngeal exudate.   Eyes:      Conjunctiva/sclera: Conjunctivae normal.   Cardiovascular:      Rate and Rhythm: Normal rate and regular rhythm.      Pulses: Normal pulses.      Heart sounds: Normal heart sounds. No murmur heard.  Pulmonary:      Effort: Pulmonary effort is normal. No respiratory distress.      Breath sounds: Normal breath sounds.   Abdominal:      General: Abdomen is flat. There is no distension.      Palpations: Abdomen is soft.      Tenderness: There is no abdominal tenderness.   Musculoskeletal:      Right lower leg: No edema.      Left lower leg: No edema.   Skin:     General: Skin is warm.   Neurological:      Mental Status: He is alert.       Assessment and Plan:     Assessment & Plan  1. Annual physical exam.  The patient's vaccinations are current, with the exception of influenza vaccines, which he typically does not receive. Patient is not interested in evaluation of CBC, CMP, lipid panel A1c or TSH.    2. Posterior neck lump.  The lump, approximately 1.5 cm in longest diameter, is non-tender and mobile, appears to be a lipoma or cyst. No intervention is deemed necessary at this juncture. Clinical observation is planned. Should the lump cause neck pain, discomfort, signs of infection, or inflammation, the patient will return for further evaluation. An ultrasound will be obtained and the patient will be referred to a surgeon for potential excision.    3. Tinea Pedis  of both feet.  A prescription for clotrimazole was issued. Gentle hygiene  instructions were provided.      Problem List Items Addressed This Visit       Lump on neck (Chronic)     Other Visit Diagnoses       Annual physical exam    -  Primary    Onychomycosis        Relevant Medications    ciclopirox (PENLAC) 8 % solution    Tinea pedis of both feet        Relevant Medications    clotrimazole (LOTRIMIN) 1 % Cream    Screen for STD (sexually transmitted disease)        Relevant Orders    Chlamydia/GC, PCR (Urine)    HIV AG/AB COMBO ASSAY SCREENING    HEP B SURFACE ANTIGEN    HEP C VIRUS ANTIBODY          HCM: as above   Labs per orders.  Vaccinations per orders.  Counseling about diet, supplements, exercise, skin care and safe sex.    Follow-up: Return in about 1 year (around 6/27/2025).    Please note that this dictation was created using voice recognition software. I have made every reasonable attempt to correct obvious errors, but I expect that there are errors of grammar and possibly content that I did not discover before finalizing the note.

## 2025-05-06 ENCOUNTER — NON-PROVIDER VISIT (OUTPATIENT)
Dept: URGENT CARE | Facility: PHYSICIAN GROUP | Age: 30
End: 2025-05-06

## 2025-05-06 DIAGNOSIS — Z02.1 PRE-EMPLOYMENT DRUG SCREENING: ICD-10-CM

## 2025-05-06 LAB
AMP AMPHETAMINE: NORMAL
COC COCAINE: NORMAL
INT CON NEG: NORMAL
INT CON POS: NORMAL
MET METHAMPHETAMINES: NORMAL
OPI OPIATES: NORMAL
PCP PHENCYCLIDINE: NORMAL
POC DRUG COMMENT 753798-OCCUPATIONAL HEALTH: NEGATIVE
THC: NORMAL

## 2025-05-06 PROCEDURE — 80305 DRUG TEST PRSMV DIR OPT OBS: CPT | Performed by: PHYSICIAN ASSISTANT

## 2025-05-07 NOTE — PROGRESS NOTES
Loyd Maddenreza Smithcon is a 29 y.o. male here for a non-provider visit for : WORK RELATED 6 PANEL DRUG SCREEN    If abnormal was an in office provider notified today (if so, indicate provider)? No    Routed to PCP? No